# Patient Record
Sex: FEMALE | Race: WHITE | Employment: FULL TIME | URBAN - NONMETROPOLITAN AREA
[De-identification: names, ages, dates, MRNs, and addresses within clinical notes are randomized per-mention and may not be internally consistent; named-entity substitution may affect disease eponyms.]

---

## 2018-07-29 ENCOUNTER — APPOINTMENT (OUTPATIENT)
Dept: CT IMAGING | Age: 63
End: 2018-07-29
Payer: COMMERCIAL

## 2018-07-29 ENCOUNTER — HOSPITAL ENCOUNTER (EMERGENCY)
Age: 63
Discharge: HOME | End: 2018-07-29
Attending: EMERGENCY MEDICINE
Payer: COMMERCIAL

## 2018-07-29 VITALS
HEART RATE: 75 BPM | OXYGEN SATURATION: 98 % | RESPIRATION RATE: 16 BRPM | TEMPERATURE: 98.4 F | DIASTOLIC BLOOD PRESSURE: 81 MMHG | SYSTOLIC BLOOD PRESSURE: 115 MMHG | WEIGHT: 200 LBS

## 2018-07-29 DIAGNOSIS — B02.29 POST HERPETIC NEURALGIA: Primary | ICD-10-CM

## 2018-07-29 DIAGNOSIS — R51.9 NONINTRACTABLE EPISODIC HEADACHE, UNSPECIFIED HEADACHE TYPE: ICD-10-CM

## 2018-07-29 DIAGNOSIS — S16.1XXA CERVICAL STRAIN, ACUTE, INITIAL ENCOUNTER: ICD-10-CM

## 2018-07-29 LAB
ABSOLUTE EOS #: 0.14 K/UL (ref 0–0.44)
ABSOLUTE IMMATURE GRANULOCYTE: <0.03 K/UL (ref 0–0.3)
ABSOLUTE LYMPH #: 1.56 K/UL (ref 1.1–3.7)
ABSOLUTE MONO #: 0.38 K/UL (ref 0.1–1.2)
ANION GAP SERPL CALCULATED.3IONS-SCNC: 13 MMOL/L (ref 9–17)
BASOPHILS # BLD: 1 % (ref 0–2)
BASOPHILS ABSOLUTE: 0.03 K/UL (ref 0–0.2)
BUN BLDV-MCNC: 7 MG/DL (ref 8–23)
BUN/CREAT BLD: 11 (ref 9–20)
CALCIUM SERPL-MCNC: 8.9 MG/DL (ref 8.6–10.4)
CHLORIDE BLD-SCNC: 105 MMOL/L (ref 98–107)
CO2: 25 MMOL/L (ref 20–31)
CREAT SERPL-MCNC: 0.64 MG/DL (ref 0.5–0.9)
DIFFERENTIAL TYPE: NORMAL
EKG ATRIAL RATE: 80 BPM
EKG P AXIS: 37 DEGREES
EKG P-R INTERVAL: 150 MS
EKG Q-T INTERVAL: 412 MS
EKG QRS DURATION: 84 MS
EKG QTC CALCULATION (BAZETT): 475 MS
EKG R AXIS: -9 DEGREES
EKG T AXIS: 34 DEGREES
EKG VENTRICULAR RATE: 80 BPM
EOSINOPHILS RELATIVE PERCENT: 2 % (ref 1–4)
GFR AFRICAN AMERICAN: >60 ML/MIN
GFR NON-AFRICAN AMERICAN: >60 ML/MIN
GFR SERPL CREATININE-BSD FRML MDRD: ABNORMAL ML/MIN/{1.73_M2}
GFR SERPL CREATININE-BSD FRML MDRD: ABNORMAL ML/MIN/{1.73_M2}
GLUCOSE BLD-MCNC: 127 MG/DL (ref 70–99)
HCT VFR BLD CALC: 39.9 % (ref 36.3–47.1)
HEMOGLOBIN: 13.4 G/DL (ref 11.9–15.1)
IMMATURE GRANULOCYTES: 0 %
LYMPHOCYTES # BLD: 27 % (ref 24–43)
MCH RBC QN AUTO: 29.8 PG (ref 25.2–33.5)
MCHC RBC AUTO-ENTMCNC: 33.6 G/DL (ref 28.4–34.8)
MCV RBC AUTO: 88.9 FL (ref 82.6–102.9)
MONOCYTES # BLD: 7 % (ref 3–12)
NRBC AUTOMATED: 0 PER 100 WBC
PDW BLD-RTO: 12.7 % (ref 11.8–14.4)
PLATELET # BLD: 210 K/UL (ref 138–453)
PLATELET ESTIMATE: NORMAL
PMV BLD AUTO: 10.8 FL (ref 8.1–13.5)
POTASSIUM SERPL-SCNC: 3.8 MMOL/L (ref 3.7–5.3)
RBC # BLD: 4.49 M/UL (ref 3.95–5.11)
RBC # BLD: NORMAL 10*6/UL
SEG NEUTROPHILS: 63 % (ref 36–65)
SEGMENTED NEUTROPHILS ABSOLUTE COUNT: 3.77 K/UL (ref 1.5–8.1)
SODIUM BLD-SCNC: 143 MMOL/L (ref 135–144)
TROPONIN INTERP: NORMAL
TROPONIN T: <0.03 NG/ML
WBC # BLD: 5.9 K/UL (ref 3.5–11.3)
WBC # BLD: NORMAL 10*3/UL

## 2018-07-29 PROCEDURE — 6370000000 HC RX 637 (ALT 250 FOR IP): Performed by: EMERGENCY MEDICINE

## 2018-07-29 PROCEDURE — 93005 ELECTROCARDIOGRAM TRACING: CPT

## 2018-07-29 PROCEDURE — 85025 COMPLETE CBC W/AUTO DIFF WBC: CPT

## 2018-07-29 PROCEDURE — 70450 CT HEAD/BRAIN W/O DYE: CPT

## 2018-07-29 PROCEDURE — 72125 CT NECK SPINE W/O DYE: CPT

## 2018-07-29 PROCEDURE — 36415 COLL VENOUS BLD VENIPUNCTURE: CPT

## 2018-07-29 PROCEDURE — 84484 ASSAY OF TROPONIN QUANT: CPT

## 2018-07-29 PROCEDURE — 99284 EMERGENCY DEPT VISIT MOD MDM: CPT

## 2018-07-29 PROCEDURE — 80048 BASIC METABOLIC PNL TOTAL CA: CPT

## 2018-07-29 RX ORDER — GABAPENTIN 100 MG/1
100 CAPSULE ORAL 3 TIMES DAILY
Qty: 30 CAPSULE | Refills: 0 | Status: SHIPPED | OUTPATIENT
Start: 2018-07-29 | End: 2018-07-29

## 2018-07-29 RX ORDER — AMOXICILLIN AND CLAVULANATE POTASSIUM 875; 125 MG/1; MG/1
1 TABLET, FILM COATED ORAL 2 TIMES DAILY
Qty: 20 TABLET | Refills: 0 | Status: SHIPPED | OUTPATIENT
Start: 2018-07-29 | End: 2018-08-08

## 2018-07-29 RX ORDER — IBUPROFEN 600 MG/1
600 TABLET ORAL ONCE
Status: COMPLETED | OUTPATIENT
Start: 2018-07-29 | End: 2018-07-29

## 2018-07-29 RX ORDER — ASPIRIN 81 MG/1
324 TABLET, CHEWABLE ORAL ONCE
Status: COMPLETED | OUTPATIENT
Start: 2018-07-29 | End: 2018-07-29

## 2018-07-29 RX ORDER — FENTANYL CITRATE 50 UG/ML
50 INJECTION, SOLUTION INTRAMUSCULAR; INTRAVENOUS ONCE
Status: DISCONTINUED | OUTPATIENT
Start: 2018-07-29 | End: 2018-07-29

## 2018-07-29 RX ORDER — GABAPENTIN 100 MG/1
100 CAPSULE ORAL 3 TIMES DAILY
Qty: 30 CAPSULE | Refills: 0 | Status: SHIPPED | OUTPATIENT
Start: 2018-07-29 | End: 2019-07-29

## 2018-07-29 RX ADMIN — IBUPROFEN 600 MG: 600 TABLET ORAL at 14:11

## 2018-07-29 RX ADMIN — ASPIRIN 81 MG 324 MG: 81 TABLET ORAL at 14:54

## 2018-07-29 ASSESSMENT — PAIN DESCRIPTION - PAIN TYPE: TYPE: ACUTE PAIN

## 2018-07-29 ASSESSMENT — PAIN SCALES - GENERAL
PAINLEVEL_OUTOF10: 6
PAINLEVEL_OUTOF10: 7

## 2018-07-29 ASSESSMENT — PAIN DESCRIPTION - DESCRIPTORS: DESCRIPTORS: ACHING

## 2018-07-29 ASSESSMENT — PAIN DESCRIPTION - LOCATION: LOCATION: HEAD

## 2018-07-29 ASSESSMENT — PAIN DESCRIPTION - ORIENTATION: ORIENTATION: POSTERIOR

## 2018-07-29 NOTE — ED PROVIDER NOTES
[facility] ED  eMERGENCY dEPARTMENT eNCOUnter      Pt Name: Zohreh Valerio  MRN: [id number]  [place] 1955  Date of evaluation: 7/29/2018  Provider: Shanta Adan MD    [address] [place] [address]       Chief Complaint   Patient presents with    Headache     pt c/o posterior headache, ongoing for a couple months  - right sided facial tingling started today         HISTORY OF PRESENT ILLNESS   (Location/Symptom, Timing/Onset, Context/Setting, Quality, Duration, Modifying Factors, Severity)  Note limiting factors. Zohreh Valerio is a 65 y.o. female who presents to the emergency department With a complaint of an occipital headache and right-sided neck paingoing on for 2-3 days. The new symptom today is right-sided facial tingling and started about an hour ago. She has not noted weakness of her face nor she had a trouble with vision or speech. She has not had weakness of her right arm or leg. There's been no tingling of the right extremities either. She states that she had a similar headache a few years ago on the occipital area that improved while she changed jobs. She was doing repetitive motion activities that she believes caused a problem with her neck. No injuries recently. She has not noted any chest pain or shortness of breath. No diaphoresis. No vomiting or nausea. She says this headache has been gradual in onset over the last several days. No history of thunderclap type headaches. No family history please and had. There is history of stroke and a grandfather who was in his early 76s when he had a stroke. When I tried to question the right side of her face the tingling she says it is really more of a puffiness feeling as opposed to any loss of sensation. Admits she is having trouble characterizing it. She did drive herself here. Further history toward the end of her ear she revealed that she was diagnosed months ago shingles in the same area where she is experiencing the pain.   I think she has in fact is postherpetic neuralgia as etiology. She also has what looks like a sinusitis with air-fluid level on the left side. HPI    Nursing Notes were reviewed. REVIEW OF SYSTEMS    (2-9 systems for level 4, 10 or more for level 5)     Review of Systems  Constitutional no lightheadedness or syncope. No fevers or chills  Eyes no loss of vision or double vision  HEENT is reviewed in HPI. She denies neck stiffness. Cardiac no chest pain or palpitations  Respiratory no shortness of breath or cough  GI no abdominal pain or vomiting   no dysuria or frequency  Musculoskeletal no joint pain or calf tenderness  Skin no rash or bruising  Neurologic is reviewed in HPI. No trouble with her balance  Except as noted above the remainder of the review of systems was reviewed and negative. PAST MEDICAL HISTORY   History reviewed. No pertinent past medical history. SURGICAL HISTORY       Past Surgical History:   Procedure Laterality Date    CARPAL TUNNEL RELEASE      TUBAL LIGATION           CURRENT MEDICATIONS       Previous Medications    No medications on file       ALLERGIES     Patient has no known allergies. FAMILY HISTORY     History reviewed. No pertinent family history. SOCIAL HISTORY       Social History     Social History    Marital status:       Spouse name: N/A    Number of children: N/A    Years of education: N/A     Social History Main Topics    Smoking status: Never Smoker    Smokeless tobacco: Never Used    Alcohol use None    Drug use: Unknown    Sexual activity: Not Asked     Other Topics Concern    None     Social History Narrative    None       SCREENINGS             PHYSICAL EXAM    (up to 7 for level 4, 8 or more for level 5)     ED Triage Vitals   BP Temp Temp Source Pulse Resp SpO2 Height Weight   07/29/18 1257 07/29/18 1257 07/29/18 1257 07/29/18 1257 07/29/18 1257 07/29/18 1257 -- 07/29/18 1303   (!) 153/89 98.4 °F (36.9 °C) Tympanic 90 16 97 White count and hemoglobin both normal.  Labs Reviewed   CBC WITH AUTO DIFFERENTIAL   BASIC METABOLIC PANEL W/ REFLEX TO MG FOR LOW K    TROPONIN       All other labs were within normal range or not returned as of this dictation. EMERGENCY DEPARTMENT COURSE and DIFFERENTIAL DIAGNOSIS/MDM:   Vitals:    Vitals:    07/29/18 1257 07/29/18 1303   BP: (!) 153/89    Pulse: 90    Resp: 16    Temp: 98.4 °F (36.9 °C)    TempSrc: Tympanic    SpO2: 97%    Weight:  200 lb (90.7 kg)       Assessment patient again at 150. Her lab work looks normal.  The patient describes the pain more as sharp in nature. It is made worse by flexing her neck while lifting something up. When she turns her head to the side she also notes pain over the right side of her neck. She does report that she's had this for about 2 months now but in the last couple days has gotten worse. It certainly looks musculoskeletal    MDM     CRITICAL CARE TIME       CONSULTS:  None    PROCEDURES:  Unless otherwise noted below, none     Procedures    FINAL IMPRESSION    Acute cervical strain and headache  secondary to postherpetic neuralgia   Cervical strain  Tingling to right side of face without evidence of CVA or TIA  DISPOSITION/PLAN   DISPOSITION    Patient is discharged on Neurontin which she has used in the past for this with success. She is also advised to get some Claritin or Zyrtec to use for her sinusitis. PATIENT REFERRED TO:  No follow-up provider specified. DISCHARGE MEDICATIONS:  New Prescriptions    No medications on file              Summation      Patient Course:  See above history and physical    ED Medications administered this visit:  Medications - No data to display    New Prescriptions from this visit:    New Prescriptions    No medications on file       Follow-up:  No follow-up provider specified.       Final Impression: See above diagnosis           (Please note that portions of this note were completed with a voice recognition program.  Efforts were made to edit the dictations but occasionally words are mis-transcribed.)      Pedro Luis Reyes MD (electronically signed)  Attending Emergency Physician         Pedro Luis Reyes., MD  07/29/18 1349

## 2018-07-29 NOTE — ED NOTES
Discharge education reviewed with patient. Patient verbalized understanding of need to follow-up with PCP as well as understanding of medications. She denies further questions/concerns at this time.       Marcio Cutler RN  07/29/18 5919

## 2019-04-15 ENCOUNTER — HOSPITAL ENCOUNTER (OUTPATIENT)
Age: 64
Discharge: HOME | End: 2019-04-17
Payer: COMMERCIAL

## 2019-04-15 ENCOUNTER — HOSPITAL ENCOUNTER (OUTPATIENT)
Dept: GENERAL RADIOLOGY | Age: 64
Discharge: HOME | End: 2019-04-17
Payer: COMMERCIAL

## 2019-04-15 DIAGNOSIS — R10.84 ABDOMINAL PAIN, GENERALIZED: ICD-10-CM

## 2019-04-15 PROCEDURE — 74019 RADEX ABDOMEN 2 VIEWS: CPT

## 2019-05-29 ENCOUNTER — HOSPITAL ENCOUNTER (OUTPATIENT)
Dept: WOMENS IMAGING | Age: 64
Discharge: HOME | End: 2019-05-31
Payer: COMMERCIAL

## 2019-05-29 DIAGNOSIS — Z12.39 SCREENING BREAST EXAMINATION: ICD-10-CM

## 2019-05-29 PROCEDURE — 77063 BREAST TOMOSYNTHESIS BI: CPT

## 2019-06-20 ENCOUNTER — HOSPITAL ENCOUNTER (OUTPATIENT)
Dept: ULTRASOUND IMAGING | Age: 64
Discharge: HOME | End: 2019-06-22
Payer: COMMERCIAL

## 2019-06-20 ENCOUNTER — HOSPITAL ENCOUNTER (OUTPATIENT)
Dept: WOMENS IMAGING | Age: 64
Discharge: HOME | End: 2019-06-22
Payer: COMMERCIAL

## 2019-06-20 DIAGNOSIS — R92.8 ABNORMAL SCREENING MAMMOGRAM: ICD-10-CM

## 2019-06-20 PROCEDURE — G0279 TOMOSYNTHESIS, MAMMO: HCPCS

## 2019-06-20 PROCEDURE — 76641 ULTRASOUND BREAST COMPLETE: CPT

## 2019-07-29 ENCOUNTER — HOSPITAL ENCOUNTER (OUTPATIENT)
Dept: NON INVASIVE DIAGNOSTICS | Age: 64
Discharge: HOME | End: 2019-07-29
Payer: COMMERCIAL

## 2019-07-29 PROCEDURE — 93017 CV STRESS TEST TRACING ONLY: CPT

## 2019-07-29 RX ORDER — ATORVASTATIN CALCIUM 10 MG/1
10 TABLET, FILM COATED ORAL
COMMUNITY
End: 2019-07-30 | Stop reason: DRUGHIGH

## 2019-07-29 NOTE — PROGRESS NOTES
Dr. Alex Cotto notified of ekg changes on regular stress. Dr. Delvis Shah notified as well. Patient to see Dr. Alex Cotto Tuesday, July 30 at 3:00 p.m. Patient instructed if she has chest pain she is to go to the emergency room immediately. She verbalized understanding. yes

## 2019-07-30 ENCOUNTER — HOSPITAL ENCOUNTER (OUTPATIENT)
Age: 64
Discharge: HOME | End: 2019-07-30
Payer: COMMERCIAL

## 2019-07-30 ENCOUNTER — HOSPITAL ENCOUNTER (OUTPATIENT)
Dept: NON INVASIVE DIAGNOSTICS | Age: 64
Discharge: HOME | End: 2019-07-30
Payer: COMMERCIAL

## 2019-07-30 ENCOUNTER — OFFICE VISIT (OUTPATIENT)
Dept: CARDIOLOGY | Age: 64
End: 2019-07-30
Payer: COMMERCIAL

## 2019-07-30 VITALS
HEART RATE: 66 BPM | DIASTOLIC BLOOD PRESSURE: 80 MMHG | BODY MASS INDEX: 33.23 KG/M2 | OXYGEN SATURATION: 98 % | WEIGHT: 206.8 LBS | HEIGHT: 66 IN | SYSTOLIC BLOOD PRESSURE: 125 MMHG

## 2019-07-30 DIAGNOSIS — R94.39 ABNORMAL STRESS TEST: ICD-10-CM

## 2019-07-30 DIAGNOSIS — E78.2 MIXED HYPERLIPIDEMIA: ICD-10-CM

## 2019-07-30 DIAGNOSIS — I20.8 ANGINAL EQUIVALENT (HCC): ICD-10-CM

## 2019-07-30 DIAGNOSIS — I20.8 STABLE ANGINA PECTORIS (HCC): ICD-10-CM

## 2019-07-30 DIAGNOSIS — R94.39 ABNORMAL STRESS TEST: Primary | ICD-10-CM

## 2019-07-30 LAB
ABSOLUTE EOS #: 0.2 K/UL (ref 0–0.44)
ABSOLUTE IMMATURE GRANULOCYTE: <0.03 K/UL (ref 0–0.3)
ABSOLUTE LYMPH #: 2.17 K/UL (ref 1.1–3.7)
ABSOLUTE MONO #: 0.6 K/UL (ref 0.1–1.2)
ANION GAP SERPL CALCULATED.3IONS-SCNC: 11 MMOL/L (ref 9–17)
BASOPHILS # BLD: 1 % (ref 0–2)
BASOPHILS ABSOLUTE: 0.05 K/UL (ref 0–0.2)
BUN BLDV-MCNC: 11 MG/DL (ref 8–23)
BUN/CREAT BLD: 15 (ref 9–20)
CALCIUM SERPL-MCNC: 9.2 MG/DL (ref 8.6–10.4)
CHLORIDE BLD-SCNC: 105 MMOL/L (ref 98–107)
CO2: 27 MMOL/L (ref 20–31)
CREAT SERPL-MCNC: 0.74 MG/DL (ref 0.5–0.9)
DIFFERENTIAL TYPE: NORMAL
EOSINOPHILS RELATIVE PERCENT: 3 % (ref 1–4)
GFR AFRICAN AMERICAN: >60 ML/MIN
GFR NON-AFRICAN AMERICAN: >60 ML/MIN
GFR SERPL CREATININE-BSD FRML MDRD: NORMAL ML/MIN/{1.73_M2}
GFR SERPL CREATININE-BSD FRML MDRD: NORMAL ML/MIN/{1.73_M2}
GLUCOSE BLD-MCNC: 99 MG/DL (ref 70–99)
HCT VFR BLD CALC: 41.3 % (ref 36.3–47.1)
HEMOGLOBIN: 13.3 G/DL (ref 11.9–15.1)
IMMATURE GRANULOCYTES: 0 %
LV EF: 55 %
LVEF MODALITY: NORMAL
LYMPHOCYTES # BLD: 28 % (ref 24–43)
MCH RBC QN AUTO: 29.6 PG (ref 25.2–33.5)
MCHC RBC AUTO-ENTMCNC: 32.2 G/DL (ref 28.4–34.8)
MCV RBC AUTO: 92 FL (ref 82.6–102.9)
MONOCYTES # BLD: 8 % (ref 3–12)
NRBC AUTOMATED: 0 PER 100 WBC
PDW BLD-RTO: 13 % (ref 11.8–14.4)
PLATELET # BLD: 227 K/UL (ref 138–453)
PLATELET ESTIMATE: NORMAL
PMV BLD AUTO: 10.8 FL (ref 8.1–13.5)
POTASSIUM SERPL-SCNC: 4.1 MMOL/L (ref 3.7–5.3)
RBC # BLD: 4.49 M/UL (ref 3.95–5.11)
RBC # BLD: NORMAL 10*6/UL
SEG NEUTROPHILS: 60 % (ref 36–65)
SEGMENTED NEUTROPHILS ABSOLUTE COUNT: 4.81 K/UL (ref 1.5–8.1)
SODIUM BLD-SCNC: 143 MMOL/L (ref 135–144)
WBC # BLD: 7.9 K/UL (ref 3.5–11.3)
WBC # BLD: NORMAL 10*3/UL

## 2019-07-30 PROCEDURE — 85025 COMPLETE CBC W/AUTO DIFF WBC: CPT

## 2019-07-30 PROCEDURE — 93306 TTE W/DOPPLER COMPLETE: CPT

## 2019-07-30 PROCEDURE — 36415 COLL VENOUS BLD VENIPUNCTURE: CPT

## 2019-07-30 PROCEDURE — 80048 BASIC METABOLIC PNL TOTAL CA: CPT

## 2019-07-30 PROCEDURE — 99243 OFF/OP CNSLTJ NEW/EST LOW 30: CPT | Performed by: INTERNAL MEDICINE

## 2019-07-30 RX ORDER — ATORVASTATIN CALCIUM 20 MG/1
20 TABLET, FILM COATED ORAL DAILY
Qty: 90 TABLET | Refills: 3 | Status: SHIPPED | OUTPATIENT
Start: 2019-07-30

## 2019-07-30 RX ORDER — METOPROLOL SUCCINATE 25 MG/1
25 TABLET, EXTENDED RELEASE ORAL DAILY
Qty: 90 TABLET | Refills: 3 | Status: SHIPPED | OUTPATIENT
Start: 2019-07-30

## 2019-07-30 RX ORDER — ATORVASTATIN CALCIUM 10 MG/1
10 TABLET, FILM COATED ORAL EVERY OTHER DAY
COMMUNITY
End: 2019-07-30 | Stop reason: DRUGHIGH

## 2019-07-30 RX ORDER — ASPIRIN 81 MG/1
81 TABLET ORAL DAILY
Qty: 90 TABLET | Refills: 3 | COMMUNITY
Start: 2019-07-30

## 2019-07-30 NOTE — PROCEDURES
III, aVF, V4, V5, V6   which did meet diagnostic criteria for myocardial ischemia with no  premature atrial contractions (PACs) and no premature ventricular  contractions (PVCs). IMPRESSION:  1. Significant electrocardiographic evidence of myocardial ischemia  during EKG monitoring without significant associated arrhythmias. 2. Ischemic ECG changes with chest pain at low level of exercise,  consider further ischemic work up, preferably via cardiac cath if  clinically indicated. The patient's Duke Treadmill score is -7 which correlates with an  intermediate/high risk significant coronary artery disease. The results of this test were discussed with Dr. Santi Mascorro on 07/29/2019  at [id number]. DEVENDRA CASTRO    D: 07/30/2019 11:04:26       T: 07/30/2019 11:05:40     [email or web address]  Job#: [id number]     Doc#: Unknown    CC:  Jase Neff

## 2019-07-30 NOTE — PROGRESS NOTES
Associated with significant shortness of breath. She takes pepcid to help relieve this pain to sleep and this helps. She gets chest ache/pressure after work and does not worsen after eating. 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 She has occasional palpitations that feel like a flutter. She also has lightheadedness/dizziness multiple times per day. Here lately, she has been having trouble sleeping. Ms. Hardik Elizabeth denied any abdominal pain, bleeding problems, problems with her medications or any other concerns at this time. PAST MEDICAL HISTORY:        Past Medical History:   Diagnosis Date    Hyperlipidemia            CURRENT ALLERGIES: Patient has no known allergies. REVIEW OF SYSTEMS: 14 systems were reviewed. Pertinent positives and negatives as above, all else negative. Past Surgical History:   Procedure Laterality Date    CARPAL TUNNEL RELEASE      TUBAL LIGATION      Social History:  Social History     Tobacco Use    Smoking status: Former Smoker     Years: 15.00     Types: Cigarettes     Last attempt to quit:      Years since quittin.    Smokeless tobacco: Never Used   Substance Use Topics    Alcohol use: Not on file    Drug use: Not on file        CURRENT MEDICATIONS:        Outpatient Medications Marked as Taking for the 19 encounter (Office Visit) with Tin Garcia MD   Medication Sig Dispense Refill    aspirin EC 81 MG EC tablet Take 1 tablet by mouth daily 90 tablet 3    metoprolol succinate (TOPROL XL) 25 MG extended release tablet Take 1 tablet by mouth daily 90 tablet 3    atorvastatin (LIPITOR) 20 MG tablet Take 1 tablet by mouth daily 90 tablet 3       FAMILY HISTORY: family history includes Heart Attack in her mother; Heart Disease in her father, mother, and sister; Heart Surgery in her father.      Physical Examination:     /80 (Site: Left Upper Arm, Position: side effects of this medication including lightheadedness and dizziness and instructed them to stop the medication of this occurs and call our office if this occurs.  Cholesterol Reduction Therapy: INCREASE to Atorvastatin (Lipitor) 20 mg daily. I discussed the potential benefits of statin therapy as well as the potential risks including myalgia as well as the rare but potentially serious complication of liver or kidney damage. Although rare, I told them that this could be serious and therefore told them to stop the medication immediately and call if they developed any severe muscle aches or pains and they agreed to do so.  Additional counseling: None and I advised them to call our office or go to the emergency room if they developed worsening or persistent chest pain or increased shortness of breath as this could be life threatening. · Hyperlipidemia: Mixed  · Cholesterol Reduction Therapy: Continue Atorvastatin (Lipitor) 10 mg every other day. In the meantime, I encouraged Ms. Dilip Galvez to continue to take her other medications. FOLLOW UP:   I told Ms. Dilip Galvez to call my office if she had any problems, but otherwise I asked her to Return in about 1 week (around 8/6/2019) for Follow up. However, I would be happy to see her sooner should the need arise. Sincerely,  José Waters MD, F.A.C.C. [facility]    [address] [place] [address]  Phone: [phone], Fax: [phone]     I believe that the risk of significant morbidity and mortality related to the patient's current medical conditions are: intermediate-high. 40 minutes were spent with the patient and all of their questions were answered. The documentation recorded by the scribe, accurately and completely reflects the services I personally performed and the decisions made by me. José Waters MD, F.A.C.C.  July 30, 2019

## 2019-07-31 ENCOUNTER — HOSPITAL ENCOUNTER (OUTPATIENT)
Dept: CARDIAC CATH/INVASIVE PROCEDURES | Age: 64
Discharge: HOME | End: 2019-07-31
Payer: COMMERCIAL

## 2019-07-31 VITALS
SYSTOLIC BLOOD PRESSURE: 116 MMHG | HEART RATE: 77 BPM | HEIGHT: 66 IN | WEIGHT: 206 LBS | BODY MASS INDEX: 33.11 KG/M2 | TEMPERATURE: 97.8 F | RESPIRATION RATE: 17 BRPM | DIASTOLIC BLOOD PRESSURE: 71 MMHG

## 2019-07-31 PROCEDURE — 2709999900 HC NON-CHARGEABLE SUPPLY

## 2019-07-31 PROCEDURE — 2500000003 HC RX 250 WO HCPCS

## 2019-07-31 PROCEDURE — 7100000011 HC PHASE II RECOVERY - ADDTL 15 MIN

## 2019-07-31 PROCEDURE — 7100000010 HC PHASE II RECOVERY - FIRST 15 MIN

## 2019-07-31 PROCEDURE — C1894 INTRO/SHEATH, NON-LASER: HCPCS

## 2019-07-31 PROCEDURE — 93458 L HRT ARTERY/VENTRICLE ANGIO: CPT | Performed by: INTERNAL MEDICINE

## 2019-07-31 PROCEDURE — C1769 GUIDE WIRE: HCPCS

## 2019-07-31 PROCEDURE — 6360000004 HC RX CONTRAST MEDICATION

## 2019-07-31 PROCEDURE — 6360000002 HC RX W HCPCS

## 2019-07-31 PROCEDURE — C1725 CATH, TRANSLUMIN NON-LASER: HCPCS

## 2019-07-31 RX ORDER — ACETAMINOPHEN 325 MG/1
650 TABLET ORAL EVERY 4 HOURS PRN
Status: CANCELLED | OUTPATIENT
Start: 2019-07-31

## 2019-07-31 RX ORDER — SODIUM CHLORIDE 9 MG/ML
INJECTION, SOLUTION INTRAVENOUS CONTINUOUS
Status: DISCONTINUED | OUTPATIENT
Start: 2019-07-31 | End: 2019-08-01 | Stop reason: HOSPADM

## 2019-07-31 RX ORDER — SODIUM CHLORIDE 0.9 % (FLUSH) 0.9 %
10 SYRINGE (ML) INJECTION PRN
Status: CANCELLED | OUTPATIENT
Start: 2019-07-31

## 2019-07-31 RX ORDER — ONDANSETRON 2 MG/ML
4 INJECTION INTRAMUSCULAR; INTRAVENOUS EVERY 6 HOURS PRN
Status: CANCELLED | OUTPATIENT
Start: 2019-07-31

## 2019-07-31 RX ORDER — SODIUM CHLORIDE 0.9 % (FLUSH) 0.9 %
10 SYRINGE (ML) INJECTION EVERY 12 HOURS SCHEDULED
Status: CANCELLED | OUTPATIENT
Start: 2019-07-31

## 2019-07-31 RX ORDER — SODIUM CHLORIDE 9 MG/ML
INJECTION, SOLUTION INTRAVENOUS CONTINUOUS
Status: CANCELLED | OUTPATIENT
Start: 2019-07-31 | End: 2019-08-03

## 2019-07-31 RX ADMIN — SODIUM CHLORIDE: 9 INJECTION, SOLUTION INTRAVENOUS at 14:21

## 2019-07-31 NOTE — PROGRESS NOTES
Patient admitted, consent signed and questions answered. Patient ready for procedure. Call light to reach with side rails up 2 of 2. sites clipped. family at bedside with patient. History and physical needs updated.

## 2019-07-31 NOTE — PROCEDURES
The patient was referred for coronary angiography and possible PCI, benefits, risks and alternatives explained and consent signed. Normal coronary arteries. Left ventricular ejection fraction is 60%. Will follow with Dr. Sven Boas as outpatient.

## 2020-10-29 ENCOUNTER — APPOINTMENT (OUTPATIENT)
Dept: CT IMAGING | Age: 65
End: 2020-10-29
Payer: COMMERCIAL

## 2020-10-29 ENCOUNTER — HOSPITAL ENCOUNTER (EMERGENCY)
Age: 65
Discharge: HOME | End: 2020-10-29
Attending: EMERGENCY MEDICINE
Payer: COMMERCIAL

## 2020-10-29 VITALS
WEIGHT: 210 LBS | HEIGHT: 66 IN | TEMPERATURE: 96.7 F | OXYGEN SATURATION: 96 % | HEART RATE: 89 BPM | SYSTOLIC BLOOD PRESSURE: 168 MMHG | RESPIRATION RATE: 18 BRPM | BODY MASS INDEX: 33.75 KG/M2 | DIASTOLIC BLOOD PRESSURE: 87 MMHG

## 2020-10-29 PROBLEM — M51.369 BULGING LUMBAR DISC: Status: ACTIVE | Noted: 2020-10-29

## 2020-10-29 PROBLEM — M54.32 SCIATICA OF LEFT SIDE: Status: ACTIVE | Noted: 2020-10-29

## 2020-10-29 PROBLEM — M51.36 BULGING LUMBAR DISC: Status: ACTIVE | Noted: 2020-10-29

## 2020-10-29 PROCEDURE — 72131 CT LUMBAR SPINE W/O DYE: CPT

## 2020-10-29 PROCEDURE — 99285 EMERGENCY DEPT VISIT HI MDM: CPT

## 2020-10-29 PROCEDURE — 6360000002 HC RX W HCPCS: Performed by: EMERGENCY MEDICINE

## 2020-10-29 PROCEDURE — 6370000000 HC RX 637 (ALT 250 FOR IP): Performed by: EMERGENCY MEDICINE

## 2020-10-29 PROCEDURE — 96372 THER/PROPH/DIAG INJ SC/IM: CPT

## 2020-10-29 RX ORDER — ORPHENADRINE CITRATE 30 MG/ML
30 INJECTION INTRAMUSCULAR; INTRAVENOUS ONCE
Status: COMPLETED | OUTPATIENT
Start: 2020-10-29 | End: 2020-10-29

## 2020-10-29 RX ORDER — KETOROLAC TROMETHAMINE 15 MG/ML
30 INJECTION, SOLUTION INTRAMUSCULAR; INTRAVENOUS ONCE
Status: COMPLETED | OUTPATIENT
Start: 2020-10-29 | End: 2020-10-29

## 2020-10-29 RX ORDER — PREDNISONE 20 MG/1
20 TABLET ORAL 2 TIMES DAILY
Qty: 14 TABLET | Refills: 0 | Status: SHIPPED | OUTPATIENT
Start: 2020-10-29 | End: 2020-11-05

## 2020-10-29 RX ORDER — ORPHENADRINE CITRATE 100 MG/1
100 TABLET, EXTENDED RELEASE ORAL 2 TIMES DAILY
Qty: 20 TABLET | Refills: 0 | Status: SHIPPED | OUTPATIENT
Start: 2020-10-29 | End: 2020-11-08

## 2020-10-29 RX ORDER — PREDNISONE 20 MG/1
20 TABLET ORAL ONCE
Status: COMPLETED | OUTPATIENT
Start: 2020-10-29 | End: 2020-10-29

## 2020-10-29 RX ADMIN — ORPHENADRINE CITRATE 30 MG: 30 INJECTION INTRAMUSCULAR; INTRAVENOUS at 15:19

## 2020-10-29 RX ADMIN — PREDNISONE 20 MG: 20 TABLET ORAL at 15:26

## 2020-10-29 RX ADMIN — KETOROLAC TROMETHAMINE 30 MG: 15 INJECTION, SOLUTION INTRAMUSCULAR; INTRAVENOUS at 15:16

## 2020-10-29 ASSESSMENT — PAIN DESCRIPTION - ORIENTATION: ORIENTATION: LOWER;RIGHT;LEFT

## 2020-10-29 ASSESSMENT — PAIN DESCRIPTION - LOCATION: LOCATION: BACK

## 2020-10-29 ASSESSMENT — ENCOUNTER SYMPTOMS
ABDOMINAL DISTENTION: 0
COUGH: 0
SHORTNESS OF BREATH: 0
ABDOMINAL PAIN: 0
WHEEZING: 0
BACK PAIN: 1

## 2020-10-29 ASSESSMENT — PAIN SCALES - GENERAL
PAINLEVEL_OUTOF10: 6
PAINLEVEL_OUTOF10: 6
PAINLEVEL_OUTOF10: 4

## 2020-10-29 ASSESSMENT — PAIN DESCRIPTION - PAIN TYPE: TYPE: ACUTE PAIN

## 2020-10-29 ASSESSMENT — PAIN DESCRIPTION - DIRECTION: RADIATING_TOWARDS: BILATERAL LEGS, ABDOMEN

## 2020-10-29 ASSESSMENT — PAIN DESCRIPTION - DESCRIPTORS: DESCRIPTORS: BURNING

## 2020-10-29 NOTE — ED PROVIDER NOTES
[facility] ED  EMERGENCY DEPARTMENT ENCOUNTER      Pt Name:Lucy Murrell  MRN: [id number]  [place] 1955  Date of evaluation: 10/29/2020  Provider: Ebony Lewis MD    CHIEF COMPLAINT     Chief Complaint   Patient presents with    Back Pain     Lumbar, bilateral, radiation to bilateral legs and abdomen. Onset yesterday morning, worse today. Pt describes as burning. HISTORY OF PRESENT ILLNESS   (Location/Symptom, Timing/Onset, Context/Setting, Quality, Duration, Modifying Factors, Severity)  Note limiting factors. HPI the patient is a 78-year-old female, who presents with low back pain. She denies falling or any other injury. 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 The pain seems to go down into the left lateral thigh but does not go past the knee. She rates the pain at a level 6 and it is sharp. Sitting up, lying down, turning are all negative    Nursing Notes were reviewed. REVIEW OF SYSTEMS    (2-9 systems for level 4, 10 or more for level 5)     Review of Systems   Constitutional: Positive for activity change. Respiratory: Negative for cough, shortness of breath and wheezing. Cardiovascular: Negative for chest pain, palpitations and leg swelling. Gastrointestinal: Negative for abdominal distention and abdominal pain. No incontinence of stool. Genitourinary: Negative for dysuria, flank pain and frequency. No incontinence of urine. Musculoskeletal: Positive for arthralgias, back pain and gait problem. Negative for neck pain and neck stiffness. Neurological: Negative for dizziness, light-headedness and headaches. Psychiatric/Behavioral: Negative for confusion, decreased concentration and dysphoric mood.               MEDICAL HISTORY     Past Medical History:   Diagnosis Date    H/O cardiac catheterization 07/31/2019    Normal coronary arteries Normal global Lt ventricular systolic function     H/O cardiovascular stress test 2019    Significant electrocardiographic evidence of MI during EKG monitoring w/o significant asso arrhythmias Treadmill Duke sore -7 intermediate/high risk significant CAD    History of echocardiogram 2019    EF of >55%. No clear evidence of diastolic dysfunction is seen.  Hyperlipidemia          SURGICAL HISTORY       Past Surgical History:   Procedure Laterality Date    CARPAL TUNNEL RELEASE      TUBAL LIGATION           CURRENT MEDICATIONS       Previous Medications    ASPIRIN EC 81 MG EC TABLET    Take 1 tablet by mouth daily    ATORVASTATIN (LIPITOR) 20 MG TABLET    Take 1 tablet by mouth daily    METOPROLOL SUCCINATE (TOPROL XL) 25 MG EXTENDED RELEASE TABLET    Take 1 tablet by mouth daily       ALLERGIES     Patient has no known allergies. FAMILY HISTORY       Family History   Problem Relation Age of Onset    Heart Disease Mother     Heart Attack Mother         requiring stenting in 62s    Heart Disease Father     Heart Surgery Father         46s    Heart Disease Sister         Stenting in her 62s          SOCIAL HISTORY       Social History     Socioeconomic History    Marital status:       Spouse name: None    Number of children: None    Years of education: None    Highest education level: None   Occupational History    None   Social Needs    Financial resource strain: None    Food insecurity     Worry: None     Inability: None    Transportation needs     Medical: None     Non-medical: None   Tobacco Use    Smoking status: Former Smoker     Years: 15.00     Types: Cigarettes     Last attempt to quit:      Years since quittin.8    Smokeless tobacco: Never Used   Substance and Sexual Activity    Alcohol use: None    Drug use: None    Sexual activity: None   Lifestyle    Physical activity     Days per week: None     Minutes per session: None    Stress: None   Relationships    Social connections     Talks on phone: None     Gets together: person, place, and time. Mental status is at baseline. Cranial Nerves: No cranial nerve deficit. Sensory: No sensory deficit. Motor: No weakness. Coordination: Coordination normal.   Psychiatric:         Mood and Affect: Mood normal.         Behavior: Behavior normal.         Thought Content: Thought content normal.         Judgment: Judgment normal.         DIAGNOSTIC RESULTS     EKG: All EKG's are interpreted by the Emergency Department Physician whoeither signs or Co-signs this chart in the absence of a cardiologist.      RADIOLOGY:   Non-plain film images such as CT, Ultrasound and MRI are read by the radiologist. Plain radiographic images are visualized and preliminarily interpreted by the emergency physician     Interpretation per the Radiologist below, if available at the time of this note:    Helen   Final Result   1. No acute fracture. 2. Mild degenerative changes. 3. L4-5 disc bulge with mild central canal narrowing. ED BEDSIDE ULTRASOUND:   Performed by ED Physician - none    LABS:  Labs Reviewed - No data to display    EMERGENCY DEPARTMENT COURSE and DIFFERENTIAL DIAGNOSIS/MDM:   Vitals:    Vitals:    10/29/20 1422   BP: (!) 168/87   Pulse: 89   Resp: 18   Temp: 96.7 °F (35.9 °C)   TempSrc: Tympanic   SpO2: 96%   Weight: 210 lb (95.3 kg)   Height: 5' 6\" (1.676 m)           MDM the patient is found to have a bulging disc. She will be put on his own and Norflex. Ultimately she may need to go onto Neurontin but I will leave this up to her PCP. CONSULTS:  None    PROCEDURES:  Unless otherwise noted below, none     Procedures    FINAL IMPRESSION      1. Bulging lumbar disc    2.  Sciatica of left side          DISPOSITION/PLAN   DISPOSITION Decision To Discharge 10/29/2020 04:05:43 PM      PATIENT REFERRED TO:  Briseida Simon MD  [address] [place] [address]  [phone]      Recheck next week      DISCHARGE MEDICATIONS:  New Prescriptions ORPHENADRINE (NORFLEX) 100 MG EXTENDED RELEASE TABLET    Take 1 tablet by mouth 2 times daily for 10 days    PREDNISONE (DELTASONE) 20 MG TABLET    Take 1 tablet by mouth 2 times daily for 7 days              (Please note that portions ofthis note were completed with a voice recognition program.  Efforts were made to edit the dictations but occasionally words are mis-transcribed.)      Marcy Baig MD (electronically signed)  Attending Emergency Physician          Yolanda Yoon MD  10/29/20 2848

## 2021-08-19 ENCOUNTER — APPOINTMENT (OUTPATIENT)
Dept: GENERAL RADIOLOGY | Age: 66
End: 2021-08-19
Payer: COMMERCIAL

## 2021-08-19 ENCOUNTER — HOSPITAL ENCOUNTER (EMERGENCY)
Age: 66
Discharge: HOME | End: 2021-08-19
Attending: EMERGENCY MEDICINE
Payer: COMMERCIAL

## 2021-08-19 VITALS
DIASTOLIC BLOOD PRESSURE: 88 MMHG | OXYGEN SATURATION: 99 % | TEMPERATURE: 98.4 F | SYSTOLIC BLOOD PRESSURE: 159 MMHG | HEART RATE: 63 BPM | RESPIRATION RATE: 14 BRPM | WEIGHT: 200 LBS | BODY MASS INDEX: 32.28 KG/M2

## 2021-08-19 DIAGNOSIS — J06.9 ACUTE UPPER RESPIRATORY INFECTION: Primary | ICD-10-CM

## 2021-08-19 LAB
-: NORMAL
ABSOLUTE EOS #: 0.03 K/UL (ref 0–0.44)
ABSOLUTE IMMATURE GRANULOCYTE: 0.04 K/UL (ref 0–0.3)
ABSOLUTE LYMPH #: 1.45 K/UL (ref 1.1–3.7)
ABSOLUTE MONO #: 0.6 K/UL (ref 0.1–1.2)
ALBUMIN SERPL-MCNC: 4.8 G/DL (ref 3.5–5.2)
ALBUMIN/GLOBULIN RATIO: 1.4 (ref 1–2.5)
ALP BLD-CCNC: 74 U/L (ref 35–104)
ALT SERPL-CCNC: 22 U/L (ref 5–33)
AMORPHOUS: NORMAL
ANION GAP SERPL CALCULATED.3IONS-SCNC: 12 MMOL/L (ref 9–17)
AST SERPL-CCNC: 19 U/L
BACTERIA: NORMAL
BASOPHILS # BLD: 0 % (ref 0–2)
BASOPHILS ABSOLUTE: 0.03 K/UL (ref 0–0.2)
BILIRUB SERPL-MCNC: 0.63 MG/DL (ref 0.3–1.2)
BILIRUBIN URINE: NEGATIVE
BUN BLDV-MCNC: 11 MG/DL (ref 8–23)
BUN/CREAT BLD: 18 (ref 9–20)
CALCIUM SERPL-MCNC: 9.9 MG/DL (ref 8.6–10.4)
CASTS UA: NORMAL /LPF
CHLORIDE BLD-SCNC: 102 MMOL/L (ref 98–107)
CO2: 24 MMOL/L (ref 20–31)
COLOR: YELLOW
COMMENT UA: ABNORMAL
CREAT SERPL-MCNC: 0.61 MG/DL (ref 0.5–0.9)
CRYSTALS, UA: NORMAL /HPF
DIFFERENTIAL TYPE: ABNORMAL
EKG ATRIAL RATE: 70 BPM
EKG P AXIS: 58 DEGREES
EKG P-R INTERVAL: 152 MS
EKG Q-T INTERVAL: 410 MS
EKG QRS DURATION: 76 MS
EKG QTC CALCULATION (BAZETT): 442 MS
EKG R AXIS: -2 DEGREES
EKG T AXIS: 17 DEGREES
EKG VENTRICULAR RATE: 70 BPM
EOSINOPHILS RELATIVE PERCENT: 0 % (ref 1–4)
EPITHELIAL CELLS UA: NORMAL /HPF (ref 0–25)
GFR AFRICAN AMERICAN: >60 ML/MIN
GFR NON-AFRICAN AMERICAN: >60 ML/MIN
GFR SERPL CREATININE-BSD FRML MDRD: NORMAL ML/MIN/{1.73_M2}
GFR SERPL CREATININE-BSD FRML MDRD: NORMAL ML/MIN/{1.73_M2}
GLUCOSE BLD-MCNC: 91 MG/DL (ref 70–99)
GLUCOSE URINE: NEGATIVE
HCT VFR BLD CALC: 41.7 % (ref 36.3–47.1)
HEMOGLOBIN: 13.5 G/DL (ref 11.9–15.1)
IMMATURE GRANULOCYTES: 0 %
KETONES, URINE: NEGATIVE
LEUKOCYTE ESTERASE, URINE: ABNORMAL
LYMPHOCYTES # BLD: 15 % (ref 24–43)
MCH RBC QN AUTO: 29.5 PG (ref 25.2–33.5)
MCHC RBC AUTO-ENTMCNC: 32.4 G/DL (ref 28.4–34.8)
MCV RBC AUTO: 91 FL (ref 82.6–102.9)
MONOCYTES # BLD: 6 % (ref 3–12)
MUCUS: NORMAL
NITRITE, URINE: NEGATIVE
NRBC AUTOMATED: 0 PER 100 WBC
OTHER OBSERVATIONS UA: NORMAL
PDW BLD-RTO: 12.6 % (ref 11.8–14.4)
PH UA: 8 (ref 5–9)
PLATELET # BLD: 233 K/UL (ref 138–453)
PLATELET ESTIMATE: ABNORMAL
PMV BLD AUTO: 10.2 FL (ref 8.1–13.5)
POTASSIUM SERPL-SCNC: 4.1 MMOL/L (ref 3.7–5.3)
PROTEIN UA: NEGATIVE
RBC # BLD: 4.58 M/UL (ref 3.95–5.11)
RBC # BLD: ABNORMAL 10*6/UL
RBC UA: NORMAL /HPF (ref 0–2)
RENAL EPITHELIAL, UA: NORMAL /HPF
SEG NEUTROPHILS: 79 % (ref 36–65)
SEGMENTED NEUTROPHILS ABSOLUTE COUNT: 7.42 K/UL (ref 1.5–8.1)
SODIUM BLD-SCNC: 138 MMOL/L (ref 135–144)
SPECIFIC GRAVITY UA: 1.01 (ref 1.01–1.02)
TOTAL PROTEIN: 8.2 G/DL (ref 6.4–8.3)
TRICHOMONAS: NORMAL
TROPONIN INTERP: NORMAL
TROPONIN INTERP: NORMAL
TROPONIN T: NORMAL NG/ML
TROPONIN T: NORMAL NG/ML
TROPONIN, HIGH SENSITIVITY: 9 NG/L (ref 0–14)
TROPONIN, HIGH SENSITIVITY: 9 NG/L (ref 0–14)
TURBIDITY: CLEAR
URINE HGB: NEGATIVE
UROBILINOGEN, URINE: NORMAL
WBC # BLD: 9.6 K/UL (ref 3.5–11.3)
WBC # BLD: ABNORMAL 10*3/UL
WBC UA: NORMAL /HPF (ref 0–5)
YEAST: NORMAL

## 2021-08-19 PROCEDURE — 81001 URINALYSIS AUTO W/SCOPE: CPT

## 2021-08-19 PROCEDURE — 6360000002 HC RX W HCPCS: Performed by: EMERGENCY MEDICINE

## 2021-08-19 PROCEDURE — 71045 X-RAY EXAM CHEST 1 VIEW: CPT

## 2021-08-19 PROCEDURE — 93005 ELECTROCARDIOGRAM TRACING: CPT | Performed by: EMERGENCY MEDICINE

## 2021-08-19 PROCEDURE — 36415 COLL VENOUS BLD VENIPUNCTURE: CPT

## 2021-08-19 PROCEDURE — 96374 THER/PROPH/DIAG INJ IV PUSH: CPT

## 2021-08-19 PROCEDURE — 85025 COMPLETE CBC W/AUTO DIFF WBC: CPT

## 2021-08-19 PROCEDURE — 80053 COMPREHEN METABOLIC PANEL: CPT

## 2021-08-19 PROCEDURE — 6370000000 HC RX 637 (ALT 250 FOR IP): Performed by: EMERGENCY MEDICINE

## 2021-08-19 PROCEDURE — 2580000003 HC RX 258: Performed by: EMERGENCY MEDICINE

## 2021-08-19 PROCEDURE — 93010 ELECTROCARDIOGRAM REPORT: CPT | Performed by: FAMILY MEDICINE

## 2021-08-19 PROCEDURE — 84484 ASSAY OF TROPONIN QUANT: CPT

## 2021-08-19 PROCEDURE — 99284 EMERGENCY DEPT VISIT MOD MDM: CPT

## 2021-08-19 PROCEDURE — C9113 INJ PANTOPRAZOLE SODIUM, VIA: HCPCS | Performed by: EMERGENCY MEDICINE

## 2021-08-19 RX ORDER — FLUTICASONE PROPIONATE 50 MCG
1 SPRAY, SUSPENSION (ML) NASAL DAILY
Status: DISCONTINUED | OUTPATIENT
Start: 2021-08-19 | End: 2021-08-19 | Stop reason: HOSPADM

## 2021-08-19 RX ORDER — CETIRIZINE HYDROCHLORIDE 10 MG/1
10 TABLET ORAL DAILY
Status: DISCONTINUED | OUTPATIENT
Start: 2021-08-19 | End: 2021-08-19 | Stop reason: HOSPADM

## 2021-08-19 RX ORDER — CETIRIZINE HYDROCHLORIDE 10 MG/1
10 TABLET ORAL DAILY
Qty: 30 TABLET | Refills: 0 | Status: SHIPPED | OUTPATIENT
Start: 2021-08-19

## 2021-08-19 RX ORDER — SODIUM CHLORIDE 9 MG/ML
10 INJECTION INTRAVENOUS ONCE
Status: COMPLETED | OUTPATIENT
Start: 2021-08-19 | End: 2021-08-19

## 2021-08-19 RX ORDER — 0.9 % SODIUM CHLORIDE 0.9 %
1000 INTRAVENOUS SOLUTION INTRAVENOUS ONCE
Status: COMPLETED | OUTPATIENT
Start: 2021-08-19 | End: 2021-08-19

## 2021-08-19 RX ORDER — PANTOPRAZOLE SODIUM 40 MG/10ML
40 INJECTION, POWDER, LYOPHILIZED, FOR SOLUTION INTRAVENOUS ONCE
Status: COMPLETED | OUTPATIENT
Start: 2021-08-19 | End: 2021-08-19

## 2021-08-19 RX ADMIN — SODIUM CHLORIDE 1000 ML: 9 INJECTION, SOLUTION INTRAVENOUS at 14:52

## 2021-08-19 RX ADMIN — CETIRIZINE HYDROCHLORIDE 10 MG: 10 TABLET, FILM COATED ORAL at 14:52

## 2021-08-19 RX ADMIN — FLUTICASONE PROPIONATE 1 SPRAY: 50 SPRAY, METERED NASAL at 14:52

## 2021-08-19 RX ADMIN — PANTOPRAZOLE SODIUM 40 MG: 40 INJECTION, POWDER, FOR SOLUTION INTRAVENOUS at 14:52

## 2021-08-19 RX ADMIN — SODIUM CHLORIDE, PRESERVATIVE FREE 10 ML: 5 INJECTION INTRAVENOUS at 14:55

## 2021-08-19 ASSESSMENT — ENCOUNTER SYMPTOMS
NAUSEA: 0
VOMITING: 1
SINUS PRESSURE: 1
ABDOMINAL DISTENTION: 0
CONSTIPATION: 0
WHEEZING: 0
SORE THROAT: 0
SHORTNESS OF BREATH: 0
DIARRHEA: 0
COUGH: 0
RHINORRHEA: 1

## 2021-08-19 NOTE — ED PROVIDER NOTES
file     Social Determinants of Health     Financial Resource Strain:     Difficulty of Paying Living Expenses:    Food Insecurity:     Worried About Running Out of Food in the Last Year:     [address] in the Last Year:    Transportation Needs:     Lack of Transportation (Medical):  Lack of Transportation (Non-Medical):    Physical Activity:     Days of Exercise per Week:     Minutes of Exercise per Session:    Stress:     Feeling of Stress :    Social Connections:     Frequency of Communication with Friends and Family:     Frequency of Social Gatherings with Friends and Family:     Attends Protestant Services:     Active Member of Clubs or Organizations:     Attends Club or Organization Meetings:     Marital Status:    Intimate Partner Violence:     Fear of Current or Ex-Partner:     Emotionally Abused:     Physically Abused:     Sexually Abused:        Family History   Problem Relation Age of Onset    Heart Disease Mother     Heart Attack Mother         requiring stenting in 62s    Heart Disease Father     Heart Surgery Father         46s    Heart Disease Sister         Stenting in her 62s       Allergies:  Patient has no known allergies. Home Medications:  Prior to Admission medications    Medication Sig Start Date End Date Taking? Authorizing Provider   cetirizine (ZYRTEC) 10 MG tablet Take 1 tablet by mouth daily 8/19/21  Yes Deandra Singh DO   aspirin EC 81 MG EC tablet Take 1 tablet by mouth daily 7/30/19   Dorian Marte MD   metoprolol succinate (TOPROL XL) 25 MG extended release tablet Take 1 tablet by mouth daily 7/30/19   Dorian Marte MD   atorvastatin (LIPITOR) 20 MG tablet Take 1 tablet by mouth daily 7/30/19   Dorian Marte MD       REVIEW OF SYSTEMS    (2-9 systems for level 4, 10 or more for level 5)      Review of Systems   Constitutional: Negative for activity change, appetite change, fatigue and fever.    HENT: Positive for congestion, postnasal drip, rhinorrhea and sinus pressure. Negative for sore throat. Respiratory: Negative for cough, shortness of breath and wheezing. Cardiovascular: Negative for chest pain, palpitations and leg swelling. Gastrointestinal: Positive for vomiting. Negative for abdominal distention, constipation, diarrhea and nausea. Genitourinary: Negative for decreased urine volume and dysuria. Skin: Negative for rash. Neurological: Positive for dizziness and light-headedness. Negative for weakness, numbness and headaches. PHYSICAL EXAM   (up to 7 for level 4, 8 or more for level 5)     INITIAL VITALS:   BP (!) 158/88   Pulse 64   Temp 98.4 °F (36.9 °C) (Tympanic)   Resp 18   Wt 200 lb (90.7 kg)   SpO2 100%   BMI 32.28 kg/m²     Physical Exam  Constitutional:       General: She is not in acute distress. Appearance: Normal appearance. She is not ill-appearing. HENT:      Head: Normocephalic and atraumatic. Comments: Fluid noted posterior to bilateral tympanic membranes but no signs of infection. Right Ear: Tympanic membrane normal.      Left Ear: Tympanic membrane normal.      Nose: Congestion present. Mouth/Throat:      Mouth: Mucous membranes are moist.      Pharynx: No oropharyngeal exudate or posterior oropharyngeal erythema. Eyes:      General:         Right eye: No discharge. Left eye: No discharge. Extraocular Movements: Extraocular movements intact. Pupils: Pupils are equal, round, and reactive to light. Cardiovascular:      Rate and Rhythm: Normal rate. Pulmonary:      Effort: Pulmonary effort is normal. No respiratory distress. Abdominal:      General: There is no distension. Palpations: There is no mass. Tenderness: There is no abdominal tenderness. There is no right CVA tenderness, left CVA tenderness, guarding or rebound. Hernia: No hernia is present. Musculoskeletal:      Right lower leg: No edema. Left lower leg: No edema. Neurological:      General: No focal deficit present. Mental Status: She is alert and oriented to person, place, and time. DIFFERENTIAL  DIAGNOSIS     Well-appearing on exam symptoms consistent with URI. She does have some congestion noted and some serous fluid. No concerns for otitis media. Will DC the antibiotics that were prescribed by urgent care as not indicated at this time. Will start with decongestant. Patient had a presyncopal episode likely related to hypovolemia given her evening of multiple episodes of vomiting. They report that her blood pressure was 160/120 at the facility which was only checked once. Which I have a feeling was inaccurate. She has been normotensive here.     PLAN (LABS / IMAGING / EKG):  Orders Placed This Encounter   Procedures    XR CHEST PORTABLE    CBC Auto Differential    Comprehensive Metabolic Panel    Troponin    Troponin    Urinalysis Reflex to Culture    Microscopic Urinalysis    Orthostatic blood pressure and pulse    Vital signs - notify MD    EKG 12 Lead    Insert peripheral IV       MEDICATIONS ORDERED:  Orders Placed This Encounter   Medications    0.9 % sodium chloride bolus    AND Linked Order Group     pantoprazole (PROTONIX) injection 40 mg     sodium chloride (PF) 0.9 % injection 10 mL    fluticasone (FLONASE) 50 MCG/ACT nasal spray 1 spray    cetirizine (ZYRTEC) tablet 10 mg    cetirizine (ZYRTEC) 10 MG tablet     Sig: Take 1 tablet by mouth daily     Dispense:  30 tablet     Refill:  0       DIAGNOSTIC RESULTS / EMERGENCY DEPARTMENT COURSE / MDM     LABS:  Results for orders placed or performed during the hospital encounter of 08/19/21   CBC Auto Differential   Result Value Ref Range    WBC 9.6 3.5 - 11.3 k/uL    RBC 4.58 3.95 - 5.11 m/uL    Hemoglobin 13.5 11.9 - 15.1 g/dL    Hematocrit 41.7 36.3 - 47.1 %    MCV 91.0 82.6 - 102.9 fL    MCH 29.5 25.2 - 33.5 pg    MCHC 32.4 28.4 - 34.8 g/dL    RDW 12.6 11.8 - 14.4 % Platelets 260 382 - 183 k/uL    MPV 10.2 8.1 - 13.5 fL    NRBC Automated 0.0 0.0 per 100 WBC    Differential Type NOT REPORTED     Seg Neutrophils 79 (H) 36 - 65 %    Lymphocytes 15 (L) 24 - 43 %    Monocytes 6 3 - 12 %    Eosinophils % 0 (L) 1 - 4 %    Basophils 0 0 - 2 %    Immature Granulocytes 0 0 %    Segs Absolute 7.42 1.50 - 8.10 k/uL    Absolute Lymph # 1.45 1.10 - 3.70 k/uL    Absolute Mono # 0.60 0.10 - 1.20 k/uL    Absolute Eos # 0.03 0.00 - 0.44 k/uL    Basophils Absolute 0.03 0.00 - 0.20 k/uL    Absolute Immature Granulocyte 0.04 0.00 - 0.30 k/uL    WBC Morphology NOT REPORTED     RBC Morphology NOT REPORTED     Platelet Estimate NOT REPORTED    Comprehensive Metabolic Panel   Result Value Ref Range    Glucose 91 70 - 99 mg/dL    BUN 11 8 - 23 mg/dL    CREATININE 0.61 0.50 - 0.90 mg/dL    Bun/Cre Ratio 18 9 - 20    Calcium 9.9 8.6 - 10.4 mg/dL    Sodium 138 135 - 144 mmol/L    Potassium 4.1 3.7 - 5.3 mmol/L    Chloride 102 98 - 107 mmol/L    CO2 24 20 - 31 mmol/L    Anion Gap 12 9 - 17 mmol/L    Alkaline Phosphatase 74 35 - 104 U/L    ALT 22 5 - 33 U/L    AST 19 <32 U/L    Total Bilirubin 0.63 0.3 - 1.2 mg/dL    Total Protein 8.2 6.4 - 8.3 g/dL    Albumin 4.8 3.5 - 5.2 g/dL    Albumin/Globulin Ratio 1.4 1.0 - 2.5    GFR Non-African American >60 >60 mL/min    GFR African American >60 >60 mL/min    GFR Comment          GFR Staging         Troponin   Result Value Ref Range    Troponin, High Sensitivity 9 0 - 14 ng/L    Troponin T NOT REPORTED <0.03 ng/mL    Troponin Interp NOT REPORTED    Troponin   Result Value Ref Range    Troponin, High Sensitivity 9 0 - 14 ng/L    Troponin T NOT REPORTED <0.03 ng/mL    Troponin Interp NOT REPORTED    Urinalysis Reflex to Culture    Specimen: Urine, clean catch   Result Value Ref Range    Color, UA YELLOW YELLOW    Turbidity UA CLEAR CLEAR    Glucose, Ur NEGATIVE NEGATIVE    Bilirubin Urine NEGATIVE NEGATIVE    Ketones, Urine NEGATIVE NEGATIVE    Specific Gravity, UA 1.015 1.010 - 1.020    Urine Hgb NEGATIVE NEGATIVE    pH, UA 8.0 5.0 - 9.0    Protein, UA NEGATIVE NEGATIVE    Urobilinogen, Urine Normal Normal    Nitrite, Urine NEGATIVE NEGATIVE    Leukocyte Esterase, Urine TRACE (A) NEGATIVE    Urinalysis Comments NOT REPORTED    Microscopic Urinalysis   Result Value Ref Range    -          WBC, UA 0 TO 2 0 - 5 /HPF    RBC, UA None 0 - 2 /HPF    Casts UA NOT REPORTED /LPF    Crystals, UA NOT REPORTED None /HPF    Epithelial Cells UA 0 TO 2 0 - 25 /HPF    Renal Epithelial, UA NOT REPORTED 0 /HPF    Bacteria, UA NOT REPORTED None    Mucus, UA NOT REPORTED None    Trichomonas, UA NOT REPORTED None    Amorphous, UA NOT REPORTED None    Other Observations UA NOT REPORTED NOT REQ. Yeast, UA NOT REPORTED None   EKG 12 Lead   Result Value Ref Range    Ventricular Rate 70 BPM    Atrial Rate 70 BPM    P-R Interval 152 ms    QRS Duration 76 ms    Q-T Interval 410 ms    QTc Calculation (Bazett) 442 ms    P Axis 58 degrees    R Axis -2 degrees    T Axis 17 degrees       IMPRESSION: URI    RADIOLOGY:  XR CHEST PORTABLE   Final Result   No acute cardiopulmonary findings               EKG:  EKG shows normal sinus rhythm with normal axis no ST elevations or depressions noted. Q-wave noted in lead III with T wave inversion. EMERGENCY DEPARTMENT COURSE:  Patient with negative troponin x2. Patient agreeable with discharge home work note provided. Plan for decongestant as well as nasal spray. Return precautions were discussed        FINAL IMPRESSION      1.  Acute upper respiratory infection          DISPOSITION / PLAN     DISPOSITION Decision To Discharge 08/19/2021 03:45:59 PM      PATIENT REFERRED TO:  Rhea Ramsay MD  [address] [place] [id number] [phone]    Schedule an appointment as soon as possible for a visit in 2 days        DISCHARGE MEDICATIONS:  New Prescriptions    CETIRIZINE (ZYRTEC) 10 MG TABLET    Take 1 tablet by mouth daily       Shaheen Palma DO  4:02 PM    Attending Emergency Physician  [facility]    (Please note that portions of this note were completed with a voice recognition program.  Effortswere made to edit the dictations but occasionally words are mis-transcribed.)              Sienna Tracy,   08/19/21 0853

## 2023-03-01 ENCOUNTER — OFFICE VISIT (OUTPATIENT)
Dept: PRIMARY CARE CLINIC | Age: 68
End: 2023-03-01
Payer: MEDICARE

## 2023-03-01 VITALS
TEMPERATURE: 97.7 F | BODY MASS INDEX: 35.72 KG/M2 | RESPIRATION RATE: 20 BRPM | DIASTOLIC BLOOD PRESSURE: 78 MMHG | HEART RATE: 68 BPM | SYSTOLIC BLOOD PRESSURE: 122 MMHG | HEIGHT: 66 IN | WEIGHT: 222.3 LBS | OXYGEN SATURATION: 98 %

## 2023-03-01 DIAGNOSIS — Z76.89 ESTABLISHING CARE WITH NEW DOCTOR, ENCOUNTER FOR: ICD-10-CM

## 2023-03-01 DIAGNOSIS — R45.0 NERVOUSNESS: ICD-10-CM

## 2023-03-01 DIAGNOSIS — Z23 NEED FOR SHINGLES VACCINE: ICD-10-CM

## 2023-03-01 DIAGNOSIS — Z13.220 LIPID SCREENING: ICD-10-CM

## 2023-03-01 DIAGNOSIS — K21.9 GASTROESOPHAGEAL REFLUX DISEASE WITHOUT ESOPHAGITIS: Primary | ICD-10-CM

## 2023-03-01 DIAGNOSIS — Z12.31 OTHER SCREENING MAMMOGRAM: ICD-10-CM

## 2023-03-01 DIAGNOSIS — B37.2 CANDIDAL INTERTRIGO: ICD-10-CM

## 2023-03-01 PROCEDURE — 1123F ACP DISCUSS/DSCN MKR DOCD: CPT | Performed by: NURSE PRACTITIONER

## 2023-03-01 PROCEDURE — 99214 OFFICE O/P EST MOD 30 MIN: CPT | Performed by: NURSE PRACTITIONER

## 2023-03-01 RX ORDER — PANTOPRAZOLE SODIUM 40 MG/1
40 TABLET, DELAYED RELEASE ORAL
Qty: 90 TABLET | Refills: 1 | Status: SHIPPED | OUTPATIENT
Start: 2023-03-01

## 2023-03-01 RX ORDER — ALPRAZOLAM 0.25 MG/1
0.25 TABLET ORAL NIGHTLY PRN
COMMUNITY
End: 2023-03-01 | Stop reason: SDUPTHER

## 2023-03-01 RX ORDER — KETOCONAZOLE 20 MG/G
CREAM TOPICAL
Qty: 30 G | Refills: 1 | Status: SHIPPED | OUTPATIENT
Start: 2023-03-01

## 2023-03-01 RX ORDER — ALPRAZOLAM 0.25 MG/1
0.25 TABLET ORAL NIGHTLY PRN
Qty: 30 TABLET | Refills: 0 | Status: SHIPPED | OUTPATIENT
Start: 2023-03-01 | End: 2023-03-31

## 2023-03-01 RX ORDER — ZOSTER VACCINE RECOMBINANT, ADJUVANTED 50 MCG/0.5
0.5 KIT INTRAMUSCULAR SEE ADMIN INSTRUCTIONS
Qty: 0.5 ML | Refills: 0 | Status: SHIPPED | OUTPATIENT
Start: 2023-03-01 | End: 2023-08-28

## 2023-03-01 SDOH — ECONOMIC STABILITY: INCOME INSECURITY: HOW HARD IS IT FOR YOU TO PAY FOR THE VERY BASICS LIKE FOOD, HOUSING, MEDICAL CARE, AND HEATING?: PATIENT DECLINED

## 2023-03-01 SDOH — ECONOMIC STABILITY: HOUSING INSECURITY
IN THE LAST 12 MONTHS, WAS THERE A TIME WHEN YOU DID NOT HAVE A STEADY PLACE TO SLEEP OR SLEPT IN A SHELTER (INCLUDING NOW)?: PATIENT REFUSED

## 2023-03-01 SDOH — ECONOMIC STABILITY: FOOD INSECURITY: WITHIN THE PAST 12 MONTHS, THE FOOD YOU BOUGHT JUST DIDN'T LAST AND YOU DIDN'T HAVE MONEY TO GET MORE.: PATIENT DECLINED

## 2023-03-01 SDOH — ECONOMIC STABILITY: FOOD INSECURITY: WITHIN THE PAST 12 MONTHS, YOU WORRIED THAT YOUR FOOD WOULD RUN OUT BEFORE YOU GOT MONEY TO BUY MORE.: PATIENT DECLINED

## 2023-03-01 ASSESSMENT — ENCOUNTER SYMPTOMS
NAUSEA: 0
STRIDOR: 0
VOMITING: 0
GLOBUS SENSATION: 1
WATER BRASH: 1
CONSTIPATION: 0
HEARTBURN: 1
COUGH: 1
SORE THROAT: 0
RHINORRHEA: 0
SHORTNESS OF BREATH: 0
HOARSE VOICE: 0
ABDOMINAL PAIN: 0
BELCHING: 1
CHOKING: 0
DIARRHEA: 0
WHEEZING: 0

## 2023-03-01 ASSESSMENT — PATIENT HEALTH QUESTIONNAIRE - PHQ9
SUM OF ALL RESPONSES TO PHQ QUESTIONS 1-9: 0
1. LITTLE INTEREST OR PLEASURE IN DOING THINGS: 0
2. FEELING DOWN, DEPRESSED OR HOPELESS: 0
SUM OF ALL RESPONSES TO PHQ9 QUESTIONS 1 & 2: 0
SUM OF ALL RESPONSES TO PHQ QUESTIONS 1-9: 0

## 2023-03-01 NOTE — PROGRESS NOTES
Name: Sp Espinosa  : 1955         Chief Complaint:     Chief Complaint   Patient presents with    Establish Care     Previous PCP Dr. Jose L Watson, has not been seen for 2 years. Gastroesophageal Reflux     Getting worse    Yeast Infection     Under breast, rash on chest and neck. History of Present Illness:      Sp Espinosa is a 78 y.o.  female who presents with Establish Care (Previous PCP Dr. Jose L Watson, has not been seen for 2 years.), Gastroesophageal Reflux (Getting worse), and Yeast Infection (Under breast, rash on chest and neck.)      Kristen Mosher is here today to establish care. PCP retired approximately 1 year ago. Nervousness/anxiety-remittent, patient has used alprazolam successfully for many years. She uses very infrequently and takes usually one half of a tablet. Patient is requesting refill at this time. Last refill was approximately 6 months ago. OARRS appropriate. She has been is having some issues with a rash acid reflux. See below for further comments. Gastroesophageal Reflux  She complains of belching, coughing, dysphagia, globus sensation, heartburn and water brash. She reports no abdominal pain, no chest pain, no choking, no early satiety, no hoarse voice, no nausea, no sore throat, no stridor or no wheezing. This is a chronic problem. The current episode started more than 1 year ago. The problem occurs frequently. The problem has been gradually worsening. The heartburn duration is several minutes. The heartburn is located in the substernum. The heartburn is of moderate intensity. The heartburn wakes her from sleep. The heartburn does not limit her activity. The heartburn changes with position. The symptoms are aggravated by certain foods and lying down. Pertinent negatives include no anemia, fatigue, melena, muscle weakness, orthopnea or weight loss. Risk factors include caffeine use, obesity and lack of exercise.  She has tried an antacid and a histamine-2 antagonist for the symptoms. The treatment provided mild relief. Past procedures do not include an EGD or a UGI. Past invasive treatments do not include gastroplasty, gastroplication or reflux surgery. Rash  This is a recurrent problem. The current episode started more than 1 month ago. The problem has been waxing and waning since onset. The affected locations include the chest. The rash is characterized by redness and itchiness (moist). It is unknown if there was an exposure to a precipitant. Associated symptoms include coughing. Pertinent negatives include no congestion, diarrhea, fatigue, fever, rhinorrhea, shortness of breath, sore throat or vomiting. Past treatments include topical steroids. The treatment provided mild relief. Her past medical history is significant for eczema. There is no history of allergies or asthma. Past Medical History:     Past Medical History:   Diagnosis Date    H/O cardiac catheterization 07/31/2019    Normal coronary arteries Normal global Lt ventricular systolic function     H/O cardiovascular stress test 07/29/2019    Significant electrocardiographic evidence of MI during EKG monitoring w/o significant asso arrhythmias Treadmill [facility] sore -7 intermediate/high risk significant CAD    History of echocardiogram 07/30/2019    EF of >55%. No clear evidence of diastolic dysfunction is seen. Hyperlipidemia       Reviewed all health maintenance requirements and ordered appropriate tests  Health Maintenance Due   Topic Date Due    Depression Screen  Never done    Colorectal Cancer Screen  Never done    Shingles vaccine (1 of 2) Never done    Lipids  03/31/2021    Breast cancer screen  06/20/2021    Annual Wellness Visit (AWV)  02/09/2023       Past Surgical History:     Past Surgical History:   Procedure Laterality Date    CARPAL TUNNEL RELEASE      TUBAL LIGATION          Medications:       Prior to Admission medications    Medication Sig Start Date End Date Taking?  Authorizing Provider   zoster recombinant adjuvanted vaccine [facility]) 50 MCG/0.5ML SUSR injection Inject 0.5 mLs into the muscle See Admin Instructions 1 dose now and repeat in 2-6 months 3/1/23 8/28/23 Yes HESHAM Chin CNP   ketoconazole (NIZORAL) 2 % cream Apply topically daily. 3/1/23  Yes HESHAM Chin CNP   pantoprazole (PROTONIX) 40 MG tablet Take 1 tablet by mouth every morning (before breakfast) 3/1/23  Yes HESHAM Chin - CNP   ALPRAZolam (XANAX) 0.25 MG tablet Take 1 tablet by mouth nightly as needed for Anxiety for up to 30 days. Max Daily Amount: 0.25 mg 3/1/23 3/31/23 Yes HESHAM Chin CNP        Allergies:       Patient has no known allergies. Social History:     Tobacco:    reports that she quit smoking about 34 years ago. Her smoking use included cigarettes. She has a 3.75 pack-year smoking history. She has never used smokeless tobacco.  Alcohol:      has no history on file for alcohol use. Drug Use:  has no history on file for drug use. Family History:     Family History   Problem Relation Age of Onset    Heart Disease Mother     Heart Attack Mother         requiring stenting in 62s    Heart Disease Father     Heart Surgery Father         46s    Heart Disease Sister         Stenting in her 62s       Review of Systems:     Positive and Negative as described in HPI    Review of Systems   Constitutional:  Negative for chills, fatigue, fever and weight loss. HENT:  Positive for postnasal drip. Negative for congestion, hoarse voice, rhinorrhea and sore throat. Eyes:  Negative for visual disturbance. Respiratory:  Positive for cough. Negative for choking, shortness of breath and wheezing. Cardiovascular:  Negative for chest pain and palpitations. Gastrointestinal:  Positive for dysphagia and heartburn. Negative for abdominal pain, constipation, diarrhea, melena, nausea and vomiting. Genitourinary:  Negative for difficulty urinating and dysuria.    Musculoskeletal: Negative for gait problem, muscle weakness, neck pain and neck stiffness. Skin:  Positive for rash. Neurological:  Negative for dizziness, syncope, light-headedness and headaches. Physical Exam:   Vitals:  /78 (Position: Sitting)   Pulse 68   Temp 97.7 °F (36.5 °C) (Temporal)   Resp 20   Ht 5' 6\" (1.676 m)   Wt 222 lb 4.8 oz (100.8 kg)   SpO2 98%   BMI 35.88 kg/m²     Physical Exam  Vitals and nursing note reviewed. Constitutional:       General: She is not in acute distress. Appearance: Normal appearance. She is obese. She is not ill-appearing. HENT:      Right Ear: Tympanic membrane normal.      Left Ear: Tympanic membrane normal.      Nose: Congestion present. No rhinorrhea. Mouth/Throat:      Mouth: Mucous membranes are moist.      Pharynx: Oropharynx is clear. Eyes:      General: No scleral icterus. Conjunctiva/sclera: Conjunctivae normal.   Neck:      Vascular: No carotid bruit. Cardiovascular:      Rate and Rhythm: Normal rate and regular rhythm. Heart sounds: No murmur heard. Pulmonary:      Effort: Pulmonary effort is normal.      Breath sounds: Normal breath sounds. No wheezing. Abdominal:      General: Bowel sounds are normal. There is no distension. Palpations: Abdomen is soft. Tenderness: There is no abdominal tenderness. Musculoskeletal:         General: Normal range of motion. Cervical back: Normal range of motion and neck supple. Right lower leg: No edema. Left lower leg: No edema. Lymphadenopathy:      Cervical: No cervical adenopathy. Skin:     General: Skin is warm and dry. Findings: Rash present. Comments: Erythematous plaque with satellite papules and pustules    Neurological:      Mental Status: She is alert and oriented to person, place, and time.    Psychiatric:         Mood and Affect: Mood normal.         Behavior: Behavior normal.       Data:     Lab Results   Component Value Date/Time    NA 138 08/19/2021 12:57 PM    K 4.1 08/19/2021 12:57 PM     08/19/2021 12:57 PM    CO2 24 08/19/2021 12:57 PM    BUN 11 08/19/2021 12:57 PM    CREATININE 0.61 08/19/2021 12:57 PM    GLUCOSE 91 08/19/2021 12:57 PM    PROT 8.2 08/19/2021 12:57 PM    LABALBU 4.8 08/19/2021 12:57 PM    BILITOT 0.63 08/19/2021 12:57 PM    ALKPHOS 74 08/19/2021 12:57 PM    AST 19 08/19/2021 12:57 PM    ALT 22 08/19/2021 12:57 PM     Lab Results   Component Value Date/Time    WBC 9.6 08/19/2021 12:57 PM    RBC 4.58 08/19/2021 12:57 PM    HGB 13.5 08/19/2021 12:57 PM    HCT 41.7 08/19/2021 12:57 PM    MCV 91.0 08/19/2021 12:57 PM    MCH 29.5 08/19/2021 12:57 PM    MCHC 32.4 08/19/2021 12:57 PM    RDW 12.6 08/19/2021 12:57 PM     08/19/2021 12:57 PM    MPV 10.2 08/19/2021 12:57 PM     No results found for: TSH  Lab Results   Component Value Date/Time    CHOL 200 03/31/2016 07:34 AM    HDL 59 03/31/2016 07:34 AM       Assessment/Plan:      Diagnosis Orders   1. Gastroesophageal reflux disease without esophagitis  pantoprazole (PROTONIX) 40 MG tablet    CBC with Auto Differential    ALT    AST    Basic Metabolic Panel      2. Nervousness  ALPRAZolam (XANAX) 0.25 MG tablet      3. Candidal intertrigo  ketoconazole (NIZORAL) 2 % cream      4. Other screening mammogram  DORIAN NIRAJ DIGITAL SCREEN BILATERAL      5. Need for shingles vaccine  zoster recombinant adjuvanted vaccine [facility]) 50 MCG/0.5ML SUSR injection      6. Lipid screening  Lipid Panel      7. Establishing care with new doctor, encounter for          I would like her to start pantoprazole daily before breakfast.  Do this for at least 2 weeks and call with progress. Sooner if any issues. Ketoconazole as needed for candidal rash. Recommend blow drying area before dressing. Continue alprazolam as needed. Labs are due now. We will follow with results. We will see her back in 6 months for routine check, sooner if any issues.       1.  Lucy received counseling on the following healthy behaviors: nutrition, exercise, and medication adherence  2. Patient given educational materials - see patient instructions  3. Was a self-tracking handout given in paper form or via [employer]? No  If yes, see orders or list here. 4.  Discussed use, benefit, and side effects of prescribed medications. Barriers to medication compliance addressed. All patient questions answered. Pt voiced understanding. 5.  Reviewed prior labs and health maintenance  6. Continue current medications, diet and exercise. Completed Refills   Requested Prescriptions     Signed Prescriptions Disp Refills    zoster recombinant adjuvanted vaccine (SHINGRIX) 50 MCG/0.5ML SUSR injection 0.5 mL 0     Sig: Inject 0.5 mLs into the muscle See Admin Instructions 1 dose now and repeat in 2-6 months    ketoconazole (NIZORAL) 2 % cream 30 g 1     Sig: Apply topically daily. pantoprazole (PROTONIX) 40 MG tablet 90 tablet 1     Sig: Take 1 tablet by mouth every morning (before breakfast)    ALPRAZolam (XANAX) 0.25 MG tablet 30 tablet 0     Sig: Take 1 tablet by mouth nightly as needed for Anxiety for up to 30 days. Max Daily Amount: 0.25 mg         Return in about 6 months (around 9/1/2023) for Check up.

## 2023-03-01 NOTE — PATIENT INSTRUCTIONS
SURVEY:     You may be receiving a survey from [employer] regarding your visit today. Please complete the survey to enable us to provide the highest quality of care to you and your family. If you cannot score us a very good on any question, please call the office to discuss how we could have made your experience a very good one.      Thank you,    Karissa Nassar, APRN-CNP  Nigel Marquez, APRN-CNP  Meghan Gambino, LIZN  Lashawn Murphy, MIO Church, MIO Cano, CMA  Conchita, PCA  Randi, PM

## 2023-03-03 ENCOUNTER — HOSPITAL ENCOUNTER (OUTPATIENT)
Dept: WOMENS IMAGING | Age: 68
End: 2023-03-03
Payer: MEDICARE

## 2023-03-03 DIAGNOSIS — Z12.31 OTHER SCREENING MAMMOGRAM: ICD-10-CM

## 2023-03-03 PROCEDURE — 77063 BREAST TOMOSYNTHESIS BI: CPT

## 2023-03-06 ENCOUNTER — TELEPHONE (OUTPATIENT)
Dept: PRIMARY CARE CLINIC | Age: 68
End: 2023-03-06

## 2023-03-06 NOTE — TELEPHONE ENCOUNTER
----- Message from [address] [place] [address], HESHAM - CNP sent at 3/6/2023  9:29 AM EST -----  Results are normal, please call patient and make them aware.

## 2023-03-09 ENCOUNTER — TELEPHONE (OUTPATIENT)
Dept: PRIMARY CARE CLINIC | Age: 68
End: 2023-03-09

## 2023-03-09 NOTE — TELEPHONE ENCOUNTER
----- Message from [facility], HESHAM - CNP sent at 3/9/2023  4:37 PM EST -----  Results are stable, continue all medications.

## 2023-06-05 ENCOUNTER — TELEPHONE (OUTPATIENT)
Dept: PRIMARY CARE CLINIC | Age: 68
End: 2023-06-05

## 2023-06-05 DIAGNOSIS — Z77.018 BERYLLIUM EXPOSURE: Primary | ICD-10-CM

## 2023-06-05 DIAGNOSIS — R45.0 NERVOUSNESS: ICD-10-CM

## 2023-06-05 RX ORDER — ALPRAZOLAM 0.25 MG/1
0.25 TABLET ORAL NIGHTLY PRN
Qty: 30 TABLET | Refills: 0 | Status: SHIPPED | OUTPATIENT
Start: 2023-06-05 | End: 2023-07-05

## 2023-06-05 NOTE — TELEPHONE ENCOUNTER
Health Maintenance   Topic Date Due    Colorectal Cancer Screen  Never done    Shingles vaccine (1 of 2) Never done    Annual Wellness Visit (AWV)  Never done    Hepatitis C screen  02/28/2024 (Originally 12/31/1973)    DEXA (modify frequency per FRAX score)  03/01/2024 (Originally 12/31/2011)    DTaP/Tdap/Td vaccine (1 - Tdap) 03/01/2024 (Originally 12/31/1975)    Flu vaccine (Season Ended) 03/01/2024 (Originally 8/1/2023)    Pneumococcal 65+ years Vaccine (1 - PCV) 03/01/2024 (Originally 12/31/2021)    COVID-19 Vaccine (4 - Booster for Pfizer series) 03/01/2024 (Originally 2/7/2022)    Depression Screen  03/01/2024    Breast cancer screen  03/03/2025    Lipids  03/09/2028    Hepatitis A vaccine  Aged Out    Hib vaccine  Aged Out    Meningococcal (ACWY) vaccine  Aged Out             (applicable per patient's age: Cancer Screenings, Depression Screening, Fall Risk Screening, Immunizations)    LDL Cholesterol (mg/dL)   Date Value   03/31/2016 115     LDL Calculated (mg/dL)   Date Value   03/09/2023 123 (H)     AST (U/L)   Date Value   03/09/2023 19     ALT (U/L)   Date Value   03/09/2023 17     BUN (mg/dL)   Date Value   03/09/2023 9      (goal A1C is < 7)   (goal LDL is <100) need 30-50% reduction from baseline     BP Readings from Last 3 Encounters:   03/01/23 122/78   08/19/21 (!) 159/88   10/29/20 (!) 168/87    (goal /80)      All Future Testing planned in [facility]:  Lab Frequency Next Occurrence   CBC with Auto Differential Once 03/01/2023   ALT Once 03/01/2023   AST Once 22/72/0717   Basic Metabolic Panel Once 51/76/4720   Lipid Panel Once 03/01/2023       Next Visit Date:  Future Appointments   Date Time Provider Jonathon Coats   9/1/2023  8:00 AM Cannon Goldmann Might, APRN - CNP [facility]            Patient Active Problem List:     Bulging lumbar disc     Sciatica of left side     Gastroesophageal reflux disease without esophagitis     Nervousness

## 2023-06-05 NOTE — TELEPHONE ENCOUNTER
Controlled Substance Monitoring:    Acute and Chronic Pain Monitoring:   RX Monitoring 6/5/2023   Periodic Controlled Substance Monitoring No signs of potential drug abuse or diversion identified.

## 2023-06-05 NOTE — TELEPHONE ENCOUNTER
Patients ex  has tested positive for berylliosis. Patient states at the time her ex worked at a [employer]. she was around him so the doctors recommend that all immediate family who was around him be tested.

## 2023-06-07 ENCOUNTER — HOSPITAL ENCOUNTER (OUTPATIENT)
Age: 68
Discharge: HOME | End: 2023-06-07

## 2023-06-07 DIAGNOSIS — Z77.018 BERYLLIUM EXPOSURE: ICD-10-CM

## 2023-06-29 ENCOUNTER — TELEPHONE (OUTPATIENT)
Dept: PRIMARY CARE CLINIC | Age: 68
End: 2023-06-29

## 2023-09-21 ENCOUNTER — OFFICE VISIT (OUTPATIENT)
Dept: PRIMARY CARE CLINIC | Age: 68
End: 2023-09-21
Payer: MEDICARE

## 2023-09-21 VITALS
SYSTOLIC BLOOD PRESSURE: 130 MMHG | OXYGEN SATURATION: 98 % | HEART RATE: 68 BPM | HEIGHT: 66 IN | BODY MASS INDEX: 35.77 KG/M2 | WEIGHT: 222.6 LBS | DIASTOLIC BLOOD PRESSURE: 82 MMHG | RESPIRATION RATE: 20 BRPM | TEMPERATURE: 98.5 F

## 2023-09-21 DIAGNOSIS — R45.0 NERVOUSNESS: ICD-10-CM

## 2023-09-21 DIAGNOSIS — Z00.00 INITIAL MEDICARE ANNUAL WELLNESS VISIT: Primary | ICD-10-CM

## 2023-09-21 DIAGNOSIS — Z12.11 SCREEN FOR COLON CANCER: ICD-10-CM

## 2023-09-21 DIAGNOSIS — K21.9 GASTROESOPHAGEAL REFLUX DISEASE WITHOUT ESOPHAGITIS: ICD-10-CM

## 2023-09-21 PROCEDURE — 1123F ACP DISCUSS/DSCN MKR DOCD: CPT | Performed by: NURSE PRACTITIONER

## 2023-09-21 PROCEDURE — G0438 PPPS, INITIAL VISIT: HCPCS | Performed by: NURSE PRACTITIONER

## 2023-09-21 RX ORDER — FAMOTIDINE 20 MG/1
TABLET, FILM COATED ORAL
COMMUNITY

## 2023-09-21 RX ORDER — ALPRAZOLAM 0.25 MG/1
0.25 TABLET ORAL NIGHTLY PRN
COMMUNITY
End: 2023-09-21 | Stop reason: SDUPTHER

## 2023-09-21 RX ORDER — ALPRAZOLAM 0.25 MG/1
0.25 TABLET ORAL NIGHTLY PRN
Qty: 30 TABLET | Refills: 0 | Status: SHIPPED | OUTPATIENT
Start: 2023-09-21 | End: 2023-10-21

## 2023-09-21 ASSESSMENT — LIFESTYLE VARIABLES
HOW OFTEN DURING THE LAST YEAR HAVE YOU FAILED TO DO WHAT WAS NORMALLY EXPECTED FROM YOU BECAUSE OF DRINKING: 0
HAVE YOU OR SOMEONE ELSE BEEN INJURED AS A RESULT OF YOUR DRINKING: 0
HOW OFTEN DURING THE LAST YEAR HAVE YOU HAD A FEELING OF GUILT OR REMORSE AFTER DRINKING: 0
HOW OFTEN DURING THE LAST YEAR HAVE YOU BEEN UNABLE TO REMEMBER WHAT HAPPENED THE NIGHT BEFORE BECAUSE YOU HAD BEEN DRINKING: 0
HOW OFTEN DURING THE LAST YEAR HAVE YOU FOUND THAT YOU WERE NOT ABLE TO STOP DRINKING ONCE YOU HAD STARTED: 0
HOW OFTEN DURING THE LAST YEAR HAVE YOU NEEDED AN ALCOHOLIC DRINK FIRST THING IN THE MORNING TO GET YOURSELF GOING AFTER A NIGHT OF HEAVY DRINKING: 0
HOW OFTEN DO YOU HAVE A DRINK CONTAINING ALCOHOL: 2-3 TIMES A WEEK
HOW MANY STANDARD DRINKS CONTAINING ALCOHOL DO YOU HAVE ON A TYPICAL DAY: 3 OR 4
HAS A RELATIVE, FRIEND, DOCTOR, OR ANOTHER HEALTH PROFESSIONAL EXPRESSED CONCERN ABOUT YOUR DRINKING OR SUGGESTED YOU CUT DOWN: 0

## 2023-09-21 ASSESSMENT — ENCOUNTER SYMPTOMS
RHINORRHEA: 0
COUGH: 0
STRIDOR: 0
HOARSE VOICE: 0
SHORTNESS OF BREATH: 0
ABDOMINAL PAIN: 0
HYPERVENTILATION: 0
VOMITING: 0
WHEEZING: 0
CHOKING: 0
GLOBUS SENSATION: 0
SORE THROAT: 0
DIARRHEA: 0
BELCHING: 0
WATER BRASH: 0
NAUSEA: 0
FEELING OF CHOKING: 0
CONSTIPATION: 0
HEARTBURN: 1

## 2023-09-21 ASSESSMENT — PATIENT HEALTH QUESTIONNAIRE - PHQ9
2. FEELING DOWN, DEPRESSED OR HOPELESS: 0
SUM OF ALL RESPONSES TO PHQ QUESTIONS 1-9: 0
1. LITTLE INTEREST OR PLEASURE IN DOING THINGS: 0
SUM OF ALL RESPONSES TO PHQ QUESTIONS 1-9: 0
SUM OF ALL RESPONSES TO PHQ QUESTIONS 1-9: 0
SUM OF ALL RESPONSES TO PHQ9 QUESTIONS 1 & 2: 0
SUM OF ALL RESPONSES TO PHQ QUESTIONS 1-9: 0

## 2023-09-21 NOTE — PATIENT INSTRUCTIONS
minutes of exercise per week or 10,000 steps per day on a pedometer . Order or download the FREE \"Exercise & Physical Activity: Your Everyday Guide\" from [employer]. Call 3-630.606.1342 or search [employer] online. You need 2700-4686 mg of calcium and 0557-5580 IU of vitamin D per day. It is possible to meet your calcium requirement with diet alone, but a vitamin D supplement is usually necessary to meet this goal.  When exposed to the sun, use a sunscreen that protects against both UVA and UVB radiation with an SPF of 30 or greater. Reapply every 2 to 3 hours or after sweating, drying off with a towel, or swimming. Always wear a seat belt when traveling in a car. Always wear a helmet when riding a bicycle or motorcycle.

## 2023-09-21 NOTE — PROGRESS NOTES
Medicare Annual Wellness Visit    Lucy Cordero is here for Medicare AWV (AWV) and Gastroesophageal Reflux (Routine check. No concerns.)    Assessment & Plan   Initial Medicare annual wellness visit  Gastroesophageal reflux disease without esophagitis  Nervousness  -     ALPRAZolam (XANAX) 0.25 MG tablet; Take 1 tablet by mouth nightly as needed for Sleep for up to 30 days. Max Daily Amount: 0.25 mg, Disp-30 tablet, R-0Normal  Screen for colon cancer  -     Fecal DNA Colorectal cancer screening (Cologuard)    Recommendations for Preventive Services Due: see orders and patient instructions/AVS.  Recommended screening schedule for the next 5-10 years is provided to the patient in written form: see Patient Instructions/AVS.     Return in 1 year (on 9/21/2024) for Wellness Visit. Yimi Romero is here today for a Medicare annual wellness visit and routine office visit. Overall she states she is feeling very well. She states she did stop using pantoprazole when to OTC famotidine which is working well for her. See below for further comments. She uses alprazolam 0.25 mg very infrequently for acute stress or insomnia. She is had 30 tablets since June 2023 and still has some left. See below for further comment. Gastroesophageal Reflux  She complains of heartburn. She reports no abdominal pain, no belching, no chest pain, no choking, no coughing, no dysphagia, no early satiety, no globus sensation, no hoarse voice, no nausea, no sore throat, no stridor, no water brash or no wheezing. This is a chronic problem. The current episode started more than 1 year ago. The problem occurs occasionally. The problem has been waxing and waning. The heartburn duration is less than a minute. The heartburn is located in the substernum. The heartburn is of mild intensity. The heartburn does not wake her from sleep. The heartburn does not limit her activity. The heartburn doesn't change with position.  The symptoms are

## 2023-09-28 ENCOUNTER — OFFICE VISIT (OUTPATIENT)
Dept: PRIMARY CARE CLINIC | Age: 68
End: 2023-09-28
Payer: MEDICARE

## 2023-09-28 VITALS
WEIGHT: 222.9 LBS | HEART RATE: 78 BPM | BODY MASS INDEX: 35.98 KG/M2 | SYSTOLIC BLOOD PRESSURE: 122 MMHG | RESPIRATION RATE: 20 BRPM | DIASTOLIC BLOOD PRESSURE: 74 MMHG | OXYGEN SATURATION: 98 % | TEMPERATURE: 98.5 F

## 2023-09-28 DIAGNOSIS — M54.50 ACUTE BILATERAL LOW BACK PAIN WITHOUT SCIATICA: Primary | ICD-10-CM

## 2023-09-28 DIAGNOSIS — M62.830 LUMBAR PARASPINAL MUSCLE SPASM: ICD-10-CM

## 2023-09-28 PROCEDURE — 96372 THER/PROPH/DIAG INJ SC/IM: CPT | Performed by: NURSE PRACTITIONER

## 2023-09-28 PROCEDURE — 1123F ACP DISCUSS/DSCN MKR DOCD: CPT | Performed by: NURSE PRACTITIONER

## 2023-09-28 PROCEDURE — 99213 OFFICE O/P EST LOW 20 MIN: CPT | Performed by: NURSE PRACTITIONER

## 2023-09-28 RX ORDER — CYCLOBENZAPRINE HCL 10 MG
10 TABLET ORAL 3 TIMES DAILY PRN
Qty: 21 TABLET | Refills: 0 | Status: SHIPPED | OUTPATIENT
Start: 2023-09-28 | End: 2023-10-08

## 2023-09-28 RX ORDER — KETOROLAC TROMETHAMINE 30 MG/ML
60 INJECTION, SOLUTION INTRAMUSCULAR; INTRAVENOUS ONCE
Status: COMPLETED | OUTPATIENT
Start: 2023-09-28 | End: 2023-09-28

## 2023-09-28 RX ADMIN — KETOROLAC TROMETHAMINE 60 MG: 30 INJECTION, SOLUTION INTRAMUSCULAR; INTRAVENOUS at 15:58

## 2023-09-28 SDOH — ECONOMIC STABILITY: FOOD INSECURITY: WITHIN THE PAST 12 MONTHS, THE FOOD YOU BOUGHT JUST DIDN'T LAST AND YOU DIDN'T HAVE MONEY TO GET MORE.: PATIENT DECLINED

## 2023-09-28 SDOH — ECONOMIC STABILITY: INCOME INSECURITY: HOW HARD IS IT FOR YOU TO PAY FOR THE VERY BASICS LIKE FOOD, HOUSING, MEDICAL CARE, AND HEATING?: PATIENT DECLINED

## 2023-09-28 SDOH — ECONOMIC STABILITY: FOOD INSECURITY: WITHIN THE PAST 12 MONTHS, YOU WORRIED THAT YOUR FOOD WOULD RUN OUT BEFORE YOU GOT MONEY TO BUY MORE.: PATIENT DECLINED

## 2023-09-28 ASSESSMENT — PATIENT HEALTH QUESTIONNAIRE - PHQ9
SUM OF ALL RESPONSES TO PHQ QUESTIONS 1-9: 0
2. FEELING DOWN, DEPRESSED OR HOPELESS: 0
1. LITTLE INTEREST OR PLEASURE IN DOING THINGS: 0
SUM OF ALL RESPONSES TO PHQ9 QUESTIONS 1 & 2: 0
SUM OF ALL RESPONSES TO PHQ QUESTIONS 1-9: 0

## 2023-09-28 ASSESSMENT — ENCOUNTER SYMPTOMS
ABDOMINAL PAIN: 0
BACK PAIN: 1
BOWEL INCONTINENCE: 0

## 2023-09-28 NOTE — PROGRESS NOTES
Name: Eva Walsh  : 1955         Chief Complaint:     Chief Complaint   Patient presents with    Lower Back Pain     X 2 days, denies injury       History of Present Illness:      Eva Walsh is a 78 y.o.  female who presents with Lower Back Pain (X 2 days, denies injury)      Moreno Alan is here today for a routine office visit. Began with back pain a few days ago. She states there is no specific incident leading up to the back pain. She has had these episodes every few years or so. She states they began with soreness and then spasm develops. She is having a hard time staying in a sitting position but she can get comfortable standing or lying down. See below for further comment. Back Pain  This is a chronic problem. The current episode started more than 1 year ago. The problem occurs intermittently. The problem has been gradually worsening since onset. The pain is present in the lumbar spine. The quality of the pain is described as aching, cramping and stabbing. The pain does not radiate. The pain is at a severity of 7/10. The pain is moderate. The pain is Worse during the day. The symptoms are aggravated by sitting. Stiffness is present All day. Pertinent negatives include no abdominal pain, bladder incontinence, bowel incontinence, chest pain, dysuria, fever, headaches, leg pain, numbness, paresis, paresthesias, pelvic pain, perianal numbness, tingling, weakness or weight loss. Risk factors include obesity. She has tried ice, heat and NSAIDs for the symptoms. The treatment provided mild relief.          Past Medical History:     Past Medical History:   Diagnosis Date    H/O cardiac catheterization 2019    Normal coronary arteries Normal global Lt ventricular systolic function     H/O cardiovascular stress test 2019    Significant electrocardiographic evidence of MI during EKG monitoring w/o significant asso arrhythmias Treadmill [facility] sore -7 intermediate/high risk significant

## 2023-09-28 NOTE — PATIENT INSTRUCTIONS
SURVEY:     You may be receiving a survey from [employer] regarding your visit today. Please complete the survey to enable us to provide the highest quality of care to you and your family. If you cannot score us a very good on any question, please call the office to discuss how we could have made your experience a very good one.      Thank you,    Feroz Nassar, APRN-CNP  Mounika Lewis, APRN-CNP  Ara Briscoe, VICKY Hassan, CMA  Ja, CMA  Rachael, CMA  Conchita, PCA  Randi, PM

## 2023-10-10 DIAGNOSIS — R19.5 POSITIVE COLORECTAL CANCER SCREENING USING COLOGUARD TEST: Primary | ICD-10-CM

## 2023-10-10 LAB — NONINV COLON CA DNA+OCC BLD SCRN STL QL: POSITIVE

## 2023-10-11 ENCOUNTER — TELEPHONE (OUTPATIENT)
Dept: PRIMARY CARE CLINIC | Age: 68
End: 2023-10-11

## 2023-10-11 NOTE — TELEPHONE ENCOUNTER
----- Message from [address] [facility], APRN - CNP sent at 10/10/2023  7:26 PM EDT -----  Positive cologard test, most likely polyps detected. Referral to  GI made. Thank you.

## 2024-01-02 ENCOUNTER — OFFICE VISIT (OUTPATIENT)
Dept: PRIMARY CARE CLINIC | Age: 69
End: 2024-01-02

## 2024-01-02 VITALS
WEIGHT: 219 LBS | DIASTOLIC BLOOD PRESSURE: 70 MMHG | BODY MASS INDEX: 35.35 KG/M2 | TEMPERATURE: 98.5 F | RESPIRATION RATE: 20 BRPM | OXYGEN SATURATION: 99 % | HEART RATE: 78 BPM | SYSTOLIC BLOOD PRESSURE: 116 MMHG

## 2024-01-02 DIAGNOSIS — J01.40 ACUTE NON-RECURRENT PANSINUSITIS: Primary | ICD-10-CM

## 2024-01-02 DIAGNOSIS — J20.9 ACUTE BRONCHITIS, UNSPECIFIED ORGANISM: ICD-10-CM

## 2024-01-02 PROCEDURE — 99214 OFFICE O/P EST MOD 30 MIN: CPT | Performed by: NURSE PRACTITIONER

## 2024-01-02 PROCEDURE — 1123F ACP DISCUSS/DSCN MKR DOCD: CPT | Performed by: NURSE PRACTITIONER

## 2024-01-02 RX ORDER — AMOXICILLIN AND CLAVULANATE POTASSIUM 875; 125 MG/1; MG/1
1 TABLET, FILM COATED ORAL 2 TIMES DAILY
Qty: 20 TABLET | Refills: 0 | Status: SHIPPED | OUTPATIENT
Start: 2024-01-02 | End: 2024-01-12

## 2024-01-02 RX ORDER — ALPRAZOLAM 0.25 MG/1
0.25 TABLET ORAL NIGHTLY PRN
COMMUNITY

## 2024-01-02 RX ORDER — CODEINE PHOSPHATE/GUAIFENESIN 10-100MG/5
10 LIQUID (ML) ORAL 4 TIMES DAILY PRN
Qty: 120 ML | Refills: 0 | Status: SHIPPED | OUTPATIENT
Start: 2024-01-02 | End: 2024-01-05

## 2024-01-02 RX ORDER — PREDNISONE 20 MG/1
40 TABLET ORAL DAILY
Qty: 10 TABLET | Refills: 0 | Status: SHIPPED | OUTPATIENT
Start: 2024-01-02 | End: 2024-01-07

## 2024-01-02 RX ORDER — ALPRAZOLAM 0.25 MG/1
0.25 TABLET ORAL NIGHTLY PRN
Qty: 30 TABLET | Refills: 0 | Status: CANCELLED | OUTPATIENT
Start: 2024-01-02 | End: 2024-02-01

## 2024-01-02 SDOH — ECONOMIC STABILITY: INCOME INSECURITY: HOW HARD IS IT FOR YOU TO PAY FOR THE VERY BASICS LIKE FOOD, HOUSING, MEDICAL CARE, AND HEATING?: PATIENT DECLINED

## 2024-01-02 SDOH — ECONOMIC STABILITY: HOUSING INSECURITY
IN THE LAST 12 MONTHS, WAS THERE A TIME WHEN YOU DID NOT HAVE A STEADY PLACE TO SLEEP OR SLEPT IN A SHELTER (INCLUDING NOW)?: PATIENT DECLINED

## 2024-01-02 SDOH — ECONOMIC STABILITY: FOOD INSECURITY: WITHIN THE PAST 12 MONTHS, YOU WORRIED THAT YOUR FOOD WOULD RUN OUT BEFORE YOU GOT MONEY TO BUY MORE.: PATIENT DECLINED

## 2024-01-02 SDOH — ECONOMIC STABILITY: FOOD INSECURITY: WITHIN THE PAST 12 MONTHS, THE FOOD YOU BOUGHT JUST DIDN'T LAST AND YOU DIDN'T HAVE MONEY TO GET MORE.: PATIENT DECLINED

## 2024-01-02 ASSESSMENT — PATIENT HEALTH QUESTIONNAIRE - PHQ9
SUM OF ALL RESPONSES TO PHQ9 QUESTIONS 1 & 2: 0
SUM OF ALL RESPONSES TO PHQ QUESTIONS 1-9: 0
SUM OF ALL RESPONSES TO PHQ QUESTIONS 1-9: 0
1. LITTLE INTEREST OR PLEASURE IN DOING THINGS: 0
2. FEELING DOWN, DEPRESSED OR HOPELESS: 0
SUM OF ALL RESPONSES TO PHQ QUESTIONS 1-9: 0
SUM OF ALL RESPONSES TO PHQ QUESTIONS 1-9: 0

## 2024-01-02 ASSESSMENT — ENCOUNTER SYMPTOMS
SWOLLEN GLANDS: 0
HEMOPTYSIS: 0
TROUBLE SWALLOWING: 0
SHORTNESS OF BREATH: 0
COUGH: 1
SINUS PRESSURE: 1
DIARRHEA: 0
SINUS PAIN: 1
WHEEZING: 1
SORE THROAT: 1
RHINORRHEA: 1
VOMITING: 0
NAUSEA: 0
VOICE CHANGE: 0
ABDOMINAL PAIN: 0
HEARTBURN: 0

## 2024-01-02 NOTE — PROGRESS NOTES
non-recurrent pansinusitis  amoxicillin-clavulanate (AUGMENTIN) 875-125 MG per tablet    predniSONE (DELTASONE) 20 MG tablet      2. Acute bronchitis, unspecified organism  POC COVID-19, FLU A/B    predniSONE (DELTASONE) 20 MG tablet    guaiFENesin-codeine (TUSSI-ORGANIDIN NR) 100-10 MG/5ML syrup          1.  Lucy received counseling on the following healthy behaviors: nutrition and medication adherence  2.  Patient given educational materials - see patient instructions  3.  Was a self-tracking handout given in paper form or via [employer]? No  If yes, see orders or list here.  4.  Discussed use, benefit, and side effects of prescribed medications.  Barriers to medication compliance addressed.  All patient questions answered.Pt voiced understanding.   5.  Reviewed prior labs and health maintenance  6.  Continue current medications, diet and exercise.    Completed Refills   Requested Prescriptions     Signed Prescriptions Disp Refills    amoxicillin-clavulanate (AUGMENTIN) 875-125 MG per tablet 20 tablet 0     Sig: Take 1 tablet by mouth 2 times daily for 10 days    predniSONE (DELTASONE) 20 MG tablet 10 tablet 0     Sig: Take 2 tablets by mouth daily for 5 days    guaiFENesin-codeine (TUSSI-ORGANIDIN NR) 100-10 MG/5ML syrup 120 mL 0     Sig: Take 10 mLs by mouth 4 times daily as needed for Cough for up to 3 days. Max Daily Amount: 40 mLs         Return if symptoms worsen or fail to improve.

## 2024-01-02 NOTE — PATIENT INSTRUCTIONS
SURVEY:     You may be receiving a survey from [facility] regarding your visit today.     Please complete the survey to enable us to provide the highest quality of care to you and your family.     If you cannot score us a very good on any question, please call the office to discuss how we could have made your experience a very good one.     Thank you,    Barry Nassar, APRN-CNP  Macy Rashid, APRN-CNP  Caroline, VICKY Lacey, MIO Shay, CMA  Rachael, CMA  Conchita, PCA  Randi, PM

## 2024-03-25 ENCOUNTER — HOSPITAL ENCOUNTER (OUTPATIENT)
Dept: WOMENS IMAGING | Age: 69
Discharge: HOME | End: 2024-03-27
Payer: MEDICARE

## 2024-03-25 DIAGNOSIS — Z12.31 SCREENING MAMMOGRAM FOR BREAST CANCER: ICD-10-CM

## 2024-03-25 PROCEDURE — 77063 BREAST TOMOSYNTHESIS BI: CPT

## 2024-08-27 ENCOUNTER — OFFICE VISIT (OUTPATIENT)
Dept: PRIMARY CARE CLINIC | Age: 69
End: 2024-08-27
Payer: MEDICARE

## 2024-08-27 VITALS
WEIGHT: 216.8 LBS | OXYGEN SATURATION: 97 % | DIASTOLIC BLOOD PRESSURE: 68 MMHG | SYSTOLIC BLOOD PRESSURE: 130 MMHG | BODY MASS INDEX: 34.99 KG/M2 | HEART RATE: 80 BPM | TEMPERATURE: 97.2 F

## 2024-08-27 DIAGNOSIS — J01.40 ACUTE NON-RECURRENT PANSINUSITIS: Primary | ICD-10-CM

## 2024-08-27 DIAGNOSIS — R05.9 COUGH, UNSPECIFIED TYPE: ICD-10-CM

## 2024-08-27 LAB
INFLUENZA A ANTIGEN, POC: NEGATIVE
INFLUENZA B ANTIGEN, POC: NEGATIVE
LOT NUMBER POC: NORMAL
SARS-COV-2 RNA POC - COV: NORMAL
VALID INTERNAL CONTROL, POC: PRESENT
VENDOR AND KIT NAME POC: NORMAL

## 2024-08-27 PROCEDURE — 1123F ACP DISCUSS/DSCN MKR DOCD: CPT | Performed by: NURSE PRACTITIONER

## 2024-08-27 PROCEDURE — 99214 OFFICE O/P EST MOD 30 MIN: CPT | Performed by: NURSE PRACTITIONER

## 2024-08-27 ASSESSMENT — ENCOUNTER SYMPTOMS
SINUS PAIN: 1
EYES NEGATIVE: 1
GASTROINTESTINAL NEGATIVE: 1
COUGH: 1
RHINORRHEA: 1
SINUS PRESSURE: 1

## 2024-08-27 NOTE — PROGRESS NOTES
[facility]  ALVARO WARREN CNP  [facility] PRIMARY CARE  [address] [address]  Dept: [phone]  Dept Fax: [phone]      Name: Lucy Valera  : 1955         Chief Complaint:     Chief Complaint   Patient presents with    Cough     Patient is here for sinus pressure in face and head. Dry cough, no fever. X 3 days. Body aches and chills on day 1 Saturday.        History of Present Illness:      Lucy Valera is a 68 y.o.  female who presents with Cough (Patient is here for sinus pressure in face and head. Dry cough, no fever. X 3 days. Body aches and chills on day 1 Saturday. )      BERTHA Ayala is here for a sick visit.  She started feeling sick 3 days ago and states she feels the same.  She has a headache with sinus pain and pressure.  She feels pain in her jaw and bilateral ears.  She has a dry cough.  Denies wheezing or shortness of breath.  No fever.  She has had body aches and chills.  She has used Tylenol and Motrin at home.  She has also taken some Zyrtec at home with no improvement.    Past Medical History:     Past Medical History:   Diagnosis Date    H/O cardiac catheterization 2019    Normal coronary arteries Normal global Lt ventricular systolic function     H/O cardiovascular stress test 2019    Significant electrocardiographic evidence of MI during EKG monitoring w/o significant asso arrhythmias Treadmill [facility] sore -7 intermediate/high risk significant CAD    History of echocardiogram 2019    EF of >55%. No clear evidence of diastolic dysfunction is seen.    Hyperlipidemia       Reviewed all health maintenance requirements and ordered appropriate tests  Health Maintenance Due   Topic Date Due    DEXA (modify frequency per FRAX score)  Never done    Annual Wellness Visit (Medicare Advantage)  2024    Flu vaccine (1) Never done       Past Surgical History:     Past Surgical History:   Procedure Laterality Date    CARPAL TUNNEL  (Site: Left Upper Arm, Position: Sitting, Cuff Size: Large Adult)   Pulse 80   Temp 97.2 °F (36.2 °C)   Wt 98.3 kg (216 lb 12.8 oz)   SpO2 97%   BMI 34.99 kg/m²     Physical Exam  Vitals and nursing note reviewed.   Constitutional:       General: She is not in acute distress.     Appearance: Normal appearance. She is obese. She is not ill-appearing.   HENT:      Head: Normocephalic.      Right Ear: Tympanic membrane and external ear normal.      Left Ear: Tympanic membrane and external ear normal.      Nose: Mucosal edema, congestion and rhinorrhea present. Rhinorrhea is clear.      Right Sinus: Maxillary sinus tenderness and frontal sinus tenderness present.      Left Sinus: Maxillary sinus tenderness and frontal sinus tenderness present.      Mouth/Throat:      Lips: Pink.      Mouth: Mucous membranes are moist.      Pharynx: Oropharyngeal exudate and postnasal drip present.   Eyes:      Extraocular Movements: Extraocular movements intact.      Conjunctiva/sclera: Conjunctivae normal.      Pupils: Pupils are equal, round, and reactive to light.   Cardiovascular:      Rate and Rhythm: Normal rate and regular rhythm.      Heart sounds: Normal heart sounds. No murmur heard.  Pulmonary:      Effort: Pulmonary effort is normal. No respiratory distress.      Breath sounds: Normal breath sounds. No wheezing.   Abdominal:      General: Bowel sounds are normal.      Palpations: Abdomen is soft.   Musculoskeletal:         General: Normal range of motion.      Cervical back: Normal range of motion and neck supple.   Skin:     General: Skin is warm.      Capillary Refill: Capillary refill takes less than 2 seconds.   Neurological:      General: No focal deficit present.      Mental Status: She is alert and oriented to person, place, and time.   Psychiatric:         Mood and Affect: Mood normal.         Behavior: Behavior normal.         Thought Content: Thought content normal.         Judgment: Judgment normal.

## 2024-08-27 NOTE — PATIENT INSTRUCTIONS
SURVEY:     You may be receiving a survey from [facility] regarding your visit today.     Please complete the survey to enable us to provide the highest quality of care to you and your family.     If you cannot score us a very good on any question, please call the office to discuss how we could have made your experience a very good one.     Thank you,    Barry Nassar, APRN-CNP  Macy Rashid, APRN-CNP  Caroline, LPN  Deepthi, CMA  Jaspal, CMA  Rachael, CMA  Conchita, PCA  Donna, CMA  Randi, PM

## 2024-09-25 ENCOUNTER — OFFICE VISIT (OUTPATIENT)
Dept: PRIMARY CARE CLINIC | Age: 69
End: 2024-09-25
Payer: MEDICARE

## 2024-09-25 VITALS
DIASTOLIC BLOOD PRESSURE: 74 MMHG | HEIGHT: 63 IN | TEMPERATURE: 98.5 F | OXYGEN SATURATION: 99 % | RESPIRATION RATE: 20 BRPM | BODY MASS INDEX: 38.09 KG/M2 | HEART RATE: 68 BPM | SYSTOLIC BLOOD PRESSURE: 128 MMHG | WEIGHT: 215 LBS

## 2024-09-25 DIAGNOSIS — Z23 NEED FOR SHINGLES VACCINE: ICD-10-CM

## 2024-09-25 DIAGNOSIS — Z78.0 POST-MENOPAUSAL: ICD-10-CM

## 2024-09-25 DIAGNOSIS — Z13.6 SCREENING FOR HEART DISEASE: ICD-10-CM

## 2024-09-25 DIAGNOSIS — Z00.00 MEDICARE ANNUAL WELLNESS VISIT, SUBSEQUENT: Primary | ICD-10-CM

## 2024-09-25 DIAGNOSIS — F41.9 ANXIETY: ICD-10-CM

## 2024-09-25 PROCEDURE — 1123F ACP DISCUSS/DSCN MKR DOCD: CPT | Performed by: NURSE PRACTITIONER

## 2024-09-25 PROCEDURE — G0439 PPPS, SUBSEQ VISIT: HCPCS | Performed by: NURSE PRACTITIONER

## 2024-09-25 PROCEDURE — 90653 IIV ADJUVANT VACCINE IM: CPT | Performed by: NURSE PRACTITIONER

## 2024-09-25 PROCEDURE — G0008 ADMIN INFLUENZA VIRUS VAC: HCPCS | Performed by: NURSE PRACTITIONER

## 2024-09-25 RX ORDER — ZOSTER VACCINE RECOMBINANT, ADJUVANTED 50 MCG/0.5
0.5 KIT INTRAMUSCULAR SEE ADMIN INSTRUCTIONS
Qty: 0.5 ML | Refills: 0 | Status: SHIPPED | OUTPATIENT
Start: 2024-09-25 | End: 2025-03-24

## 2024-09-25 RX ORDER — ALPRAZOLAM 0.25 MG
0.25 TABLET ORAL NIGHTLY PRN
Qty: 30 TABLET | Refills: 1 | Status: SHIPPED | OUTPATIENT
Start: 2024-09-25 | End: 2024-09-25 | Stop reason: SDUPTHER

## 2024-09-25 RX ORDER — ALPRAZOLAM 0.25 MG
0.25 TABLET ORAL NIGHTLY PRN
Qty: 30 TABLET | Refills: 1 | Status: SHIPPED | OUTPATIENT
Start: 2024-09-25 | End: 2024-11-24

## 2024-09-25 ASSESSMENT — LIFESTYLE VARIABLES
HOW OFTEN DURING THE LAST YEAR HAVE YOU FOUND THAT YOU WERE NOT ABLE TO STOP DRINKING ONCE YOU HAD STARTED: NEVER
HOW OFTEN DO YOU HAVE A DRINK CONTAINING ALCOHOL: 2-3 TIMES A WEEK
HOW MANY STANDARD DRINKS CONTAINING ALCOHOL DO YOU HAVE ON A TYPICAL DAY: 3 OR 4
HOW OFTEN DURING THE LAST YEAR HAVE YOU HAD A FEELING OF GUILT OR REMORSE AFTER DRINKING: NEVER
HAS A RELATIVE, FRIEND, DOCTOR, OR ANOTHER HEALTH PROFESSIONAL EXPRESSED CONCERN ABOUT YOUR DRINKING OR SUGGESTED YOU CUT DOWN: NO
HAVE YOU OR SOMEONE ELSE BEEN INJURED AS A RESULT OF YOUR DRINKING: NO
HOW OFTEN DURING THE LAST YEAR HAVE YOU NEEDED AN ALCOHOLIC DRINK FIRST THING IN THE MORNING TO GET YOURSELF GOING AFTER A NIGHT OF HEAVY DRINKING: NEVER
HOW OFTEN DURING THE LAST YEAR HAVE YOU FAILED TO DO WHAT WAS NORMALLY EXPECTED FROM YOU BECAUSE OF DRINKING: NEVER
HOW OFTEN DURING THE LAST YEAR HAVE YOU BEEN UNABLE TO REMEMBER WHAT HAPPENED THE NIGHT BEFORE BECAUSE YOU HAD BEEN DRINKING: NEVER

## 2024-09-25 ASSESSMENT — PATIENT HEALTH QUESTIONNAIRE - PHQ9
SUM OF ALL RESPONSES TO PHQ QUESTIONS 1-9: 0
1. LITTLE INTEREST OR PLEASURE IN DOING THINGS: NOT AT ALL
SUM OF ALL RESPONSES TO PHQ9 QUESTIONS 1 & 2: 0
SUM OF ALL RESPONSES TO PHQ QUESTIONS 1-9: 0
2. FEELING DOWN, DEPRESSED OR HOPELESS: NOT AT ALL

## 2024-09-25 ASSESSMENT — ENCOUNTER SYMPTOMS
HYPERVENTILATION: 0
SHORTNESS OF BREATH: 0
FEELING OF CHOKING: 0
NAUSEA: 0

## 2024-10-01 ENCOUNTER — TELEPHONE (OUTPATIENT)
Dept: PRIMARY CARE CLINIC | Age: 69
End: 2024-10-01

## 2024-10-01 LAB
ALT SERPL-CCNC: 18 U/L (ref 5–33)
AST SERPL-CCNC: 18 U/L (ref 9–40)
BUN BLDV-MCNC: 17 MG/DL (ref 8–23)
CALCIUM SERPL-MCNC: 9.1 MG/DL (ref 8.6–10.5)
CHLORIDE BLD-SCNC: 106 MMOL/L (ref 96–107)
CHOLESTEROL, TOTAL: 230 MG/DL (ref 100–199)
CHOLESTEROL/HDL RATIO: 4.3 (ref 2–4.5)
CO2: 24 MMOL/L (ref 18–32)
CREAT SERPL-MCNC: 0.75 MG/DL (ref 0.51–1.15)
EGFR IF NONAFRICAN AMERICAN: 87 ML/MIN/1.73M2
GLUCOSE: 87 MG/DL (ref 70–100)
HDLC SERPL-MCNC: 54 MG/DL
LDL CHOLESTEROL: 138 MG/DL
LDL/HDL RATIO: 2.6
POTASSIUM SERPL-SCNC: 4.3 MMOL/L (ref 3.5–5.4)
SODIUM BLD-SCNC: 142 MMOL/L (ref 135–148)
TRIGL SERPL-MCNC: 190 MG/DL (ref 20–149)
VLDLC SERPL CALC-MCNC: 38 MG/DL

## 2024-10-01 NOTE — TELEPHONE ENCOUNTER
----- Message from HESHAM Pedersen CNP sent at 10/1/2024 10:10 AM EDT -----  Cholesterol is a little worse.  Otherwise labs are stable.  Would avoid fatty sugary foods as much as possible.

## 2025-01-16 ENCOUNTER — PATIENT MESSAGE (OUTPATIENT)
Dept: PRIMARY CARE CLINIC | Age: 70
End: 2025-01-16

## 2025-01-16 ENCOUNTER — OFFICE VISIT (OUTPATIENT)
Dept: PRIMARY CARE CLINIC | Age: 70
End: 2025-01-16
Payer: MEDICARE

## 2025-01-16 VITALS
SYSTOLIC BLOOD PRESSURE: 114 MMHG | TEMPERATURE: 97.6 F | WEIGHT: 216.2 LBS | RESPIRATION RATE: 16 BRPM | DIASTOLIC BLOOD PRESSURE: 82 MMHG | BODY MASS INDEX: 38.31 KG/M2 | HEART RATE: 81 BPM | OXYGEN SATURATION: 99 %

## 2025-01-16 DIAGNOSIS — R05.9 COUGH, UNSPECIFIED TYPE: ICD-10-CM

## 2025-01-16 DIAGNOSIS — H92.01 OTALGIA OF RIGHT EAR: ICD-10-CM

## 2025-01-16 DIAGNOSIS — H60.501 ACUTE OTITIS EXTERNA OF RIGHT EAR, UNSPECIFIED TYPE: ICD-10-CM

## 2025-01-16 DIAGNOSIS — U07.1 COVID: Primary | ICD-10-CM

## 2025-01-16 LAB
INFLUENZA A ANTIGEN, POC: NEGATIVE
INFLUENZA B ANTIGEN, POC: NEGATIVE
LOT NUMBER POC: ABNORMAL
SARS-COV-2 RNA POC - COV: DETECTED
VALID INTERNAL CONTROL, POC: PRESENT
VENDOR AND KIT NAME POC: ABNORMAL

## 2025-01-16 PROCEDURE — 99214 OFFICE O/P EST MOD 30 MIN: CPT | Performed by: NURSE PRACTITIONER

## 2025-01-16 PROCEDURE — 1159F MED LIST DOCD IN RCRD: CPT | Performed by: NURSE PRACTITIONER

## 2025-01-16 PROCEDURE — 1123F ACP DISCUSS/DSCN MKR DOCD: CPT | Performed by: NURSE PRACTITIONER

## 2025-01-16 PROCEDURE — 1160F RVW MEDS BY RX/DR IN RCRD: CPT | Performed by: NURSE PRACTITIONER

## 2025-01-16 RX ORDER — OFLOXACIN 3 MG/ML
10 SOLUTION AURICULAR (OTIC) 2 TIMES DAILY
Qty: 10 ML | Refills: 0 | Status: SHIPPED | OUTPATIENT
Start: 2025-01-16 | End: 2025-01-26

## 2025-01-16 RX ORDER — FLUTICASONE PROPIONATE 50 MCG
1 SPRAY, SUSPENSION (ML) NASAL DAILY
Qty: 32 G | Refills: 1 | Status: SHIPPED | OUTPATIENT
Start: 2025-01-16

## 2025-01-16 SDOH — ECONOMIC STABILITY: FOOD INSECURITY: WITHIN THE PAST 12 MONTHS, YOU WORRIED THAT YOUR FOOD WOULD RUN OUT BEFORE YOU GOT MONEY TO BUY MORE.: NEVER TRUE

## 2025-01-16 SDOH — ECONOMIC STABILITY: FOOD INSECURITY: WITHIN THE PAST 12 MONTHS, THE FOOD YOU BOUGHT JUST DIDN'T LAST AND YOU DIDN'T HAVE MONEY TO GET MORE.: NEVER TRUE

## 2025-01-16 ASSESSMENT — ENCOUNTER SYMPTOMS
SHORTNESS OF BREATH: 0
COUGH: 1
ALLERGIC/IMMUNOLOGIC NEGATIVE: 1
RHINORRHEA: 1
EYES NEGATIVE: 1
WHEEZING: 0
SORE THROAT: 1
GASTROINTESTINAL NEGATIVE: 1

## 2025-01-16 ASSESSMENT — PATIENT HEALTH QUESTIONNAIRE - PHQ9
SUM OF ALL RESPONSES TO PHQ QUESTIONS 1-9: 0
SUM OF ALL RESPONSES TO PHQ QUESTIONS 1-9: 0
1. LITTLE INTEREST OR PLEASURE IN DOING THINGS: NOT AT ALL
2. FEELING DOWN, DEPRESSED OR HOPELESS: NOT AT ALL
SUM OF ALL RESPONSES TO PHQ QUESTIONS 1-9: 0
SUM OF ALL RESPONSES TO PHQ QUESTIONS 1-9: 0
SUM OF ALL RESPONSES TO PHQ9 QUESTIONS 1 & 2: 0

## 2025-01-16 NOTE — PROGRESS NOTES
[facility] PHYSICIANS  ALVARO WARREN CNP  [facility] PRIMARY CARE  [address] [address]  Dept: [phone]  Dept Fax: [phone]      Name: Lucy Valera  : 1955         Chief Complaint:     Chief Complaint   Patient presents with    Congestion     X 2 days.     Cough     X 2 days.     Ear Pain     X 2 days. Patient c/o right ear pain.        History of Present Illness:      Lucy Valera is a 69 y.o.  female who presents with Congestion (X 2 days. ), Cough (X 2 days. ), and Ear Pain (X 2 days. Patient c/o right ear pain. )      BERTHA Ayala is here today for a sick visit.  She started with fatigue a week ago.  Two days ago she started with rhinorrhea and congestion.  She has right ear pain and pain on the right side of her jaw.  She has had bloody drainage come out of right ear.  She has a slight dry cough.  Denies shortness of breath or wheezing.  She has a scratchy throat.  Denies body aches.  She has a headache.  She has had a low grade fever on and off.  She has taken Zyrtec and Tylenol for her symptoms.      Past Medical History:     Past Medical History:   Diagnosis Date    H/O cardiac catheterization 2019    Normal coronary arteries Normal global Lt ventricular systolic function     H/O cardiovascular stress test 2019    Significant electrocardiographic evidence of MI during EKG monitoring w/o significant asso arrhythmias Treadmill [facility] sore -7 intermediate/high risk significant CAD    History of echocardiogram 2019    EF of >55%. No clear evidence of diastolic dysfunction is seen.    Hyperlipidemia       Reviewed all health maintenance requirements and ordered appropriate tests  Health Maintenance Due   Topic Date Due    DEXA (modify frequency per FRAX score)  Never done    Annual Wellness Visit (Medicare Advantage)  2025       Past Surgical History:     Past Surgical History:   Procedure Laterality Date    CARPAL TUNNEL RELEASE      TUBAL

## 2025-01-16 NOTE — PATIENT INSTRUCTIONS
SURVEY:     You may be receiving a survey from [facility] [employer] regarding your visit today.     Please complete the survey to enable us to provide the highest quality of care to you and your family.     If you cannot score us a very good on any question, please call the office to discuss how we could have made your experience a very good one.     Thank you,    Barry Nassar, APRN-CNP  Macy Rashid, APRN-CNP  Caroline, LPN  Deepthi, CMA  Jaspal, CMA  Rachael, CMA  Conchita, PCA  Donna, CMA  Randi, PM

## 2025-03-24 ENCOUNTER — OFFICE VISIT (OUTPATIENT)
Dept: PRIMARY CARE CLINIC | Age: 70
End: 2025-03-24
Payer: MEDICARE

## 2025-03-24 ENCOUNTER — HOSPITAL ENCOUNTER (OUTPATIENT)
Age: 70
Discharge: HOME | End: 2025-03-24
Payer: MEDICARE

## 2025-03-24 ENCOUNTER — HOSPITAL ENCOUNTER (OUTPATIENT)
Dept: CT IMAGING | Age: 70
Discharge: HOME | End: 2025-03-26
Payer: MEDICARE

## 2025-03-24 ENCOUNTER — RESULTS FOLLOW-UP (OUTPATIENT)
Dept: PRIMARY CARE CLINIC | Age: 70
End: 2025-03-24

## 2025-03-24 VITALS
SYSTOLIC BLOOD PRESSURE: 116 MMHG | TEMPERATURE: 97.9 F | HEART RATE: 88 BPM | WEIGHT: 210.2 LBS | RESPIRATION RATE: 18 BRPM | BODY MASS INDEX: 37.24 KG/M2 | OXYGEN SATURATION: 99 % | DIASTOLIC BLOOD PRESSURE: 70 MMHG

## 2025-03-24 DIAGNOSIS — R10.12 LEFT UPPER QUADRANT ABDOMINAL PAIN: Primary | ICD-10-CM

## 2025-03-24 DIAGNOSIS — R10.12 LEFT UPPER QUADRANT ABDOMINAL PAIN: ICD-10-CM

## 2025-03-24 DIAGNOSIS — R07.9 CHEST PAIN, UNSPECIFIED TYPE: ICD-10-CM

## 2025-03-24 DIAGNOSIS — K21.00 GASTROESOPHAGEAL REFLUX DISEASE WITH ESOPHAGITIS, UNSPECIFIED WHETHER HEMORRHAGE: ICD-10-CM

## 2025-03-24 DIAGNOSIS — R06.02 SHORTNESS OF BREATH: ICD-10-CM

## 2025-03-24 DIAGNOSIS — R11.11 VOMITING WITHOUT NAUSEA, UNSPECIFIED VOMITING TYPE: ICD-10-CM

## 2025-03-24 LAB
ALBUMIN SERPL-MCNC: 4.7 G/DL (ref 3.5–5.2)
ALBUMIN/GLOB SERPL: 1.6 {RATIO} (ref 1–2.5)
ALP SERPL-CCNC: 57 U/L (ref 35–104)
ALT SERPL-CCNC: 19 U/L (ref 10–35)
ANION GAP SERPL CALCULATED.3IONS-SCNC: 12 MMOL/L (ref 9–16)
AST SERPL-CCNC: 21 U/L (ref 10–35)
BASOPHILS # BLD: 0.03 K/UL (ref 0–0.2)
BASOPHILS NFR BLD: 0 % (ref 0–2)
BILIRUB SERPL-MCNC: 0.6 MG/DL (ref 0–1.2)
BNP SERPL-MCNC: <36 PG/ML (ref 0–125)
BUN SERPL-MCNC: 11 MG/DL (ref 8–23)
BUN/CREAT SERPL: 14 (ref 9–20)
CALCIUM SERPL-MCNC: 9.4 MG/DL (ref 8.6–10.4)
CHLORIDE SERPL-SCNC: 104 MMOL/L (ref 98–107)
CO2 SERPL-SCNC: 25 MMOL/L (ref 20–31)
CREAT SERPL-MCNC: 0.8 MG/DL (ref 0.5–0.9)
CRP SERPL HS-MCNC: 3.5 MG/L (ref 0–5)
D DIMER PPP FEU-MCNC: <0.27 UG/ML FEU (ref 0–0.59)
EOSINOPHIL # BLD: 0.03 K/UL (ref 0–0.44)
EOSINOPHILS RELATIVE PERCENT: 0 % (ref 1–4)
ERYTHROCYTE [DISTWIDTH] IN BLOOD BY AUTOMATED COUNT: 12.6 % (ref 11.8–14.4)
ERYTHROCYTE [SEDIMENTATION RATE] IN BLOOD BY PHOTOMETRIC METHOD: 2 MM/HR (ref 0–30)
GFR, ESTIMATED: 82 ML/MIN/1.73M2
GLUCOSE SERPL-MCNC: 112 MG/DL (ref 74–99)
HCT VFR BLD AUTO: 42.4 % (ref 36.3–47.1)
HGB BLD-MCNC: 14.4 G/DL (ref 11.9–15.1)
IMM GRANULOCYTES # BLD AUTO: <0.03 K/UL (ref 0–0.3)
IMM GRANULOCYTES NFR BLD: 0 %
LYMPHOCYTES NFR BLD: 1.66 K/UL (ref 1.1–3.7)
LYMPHOCYTES RELATIVE PERCENT: 23 % (ref 24–43)
MCH RBC QN AUTO: 30.5 PG (ref 25.2–33.5)
MCHC RBC AUTO-ENTMCNC: 34 G/DL (ref 28.4–34.8)
MCV RBC AUTO: 89.8 FL (ref 82.6–102.9)
MONOCYTES NFR BLD: 0.49 K/UL (ref 0.1–1.2)
MONOCYTES NFR BLD: 7 % (ref 3–12)
NEUTROPHILS NFR BLD: 70 % (ref 36–65)
NEUTS SEG NFR BLD: 5.14 K/UL (ref 1.5–8.1)
NRBC BLD-RTO: 0 PER 100 WBC
PLATELET # BLD AUTO: 264 K/UL (ref 138–453)
PMV BLD AUTO: 10.4 FL (ref 8.1–13.5)
POTASSIUM SERPL-SCNC: 4 MMOL/L (ref 3.7–5.3)
PROT SERPL-MCNC: 7.5 G/DL (ref 6.6–8.7)
RBC # BLD AUTO: 4.72 M/UL (ref 3.95–5.11)
SODIUM SERPL-SCNC: 141 MMOL/L (ref 136–145)
TROPONIN I SERPL HS-MCNC: 10 NG/L (ref 0–14)
WBC OTHER # BLD: 7.4 K/UL (ref 3.5–11.3)

## 2025-03-24 PROCEDURE — 1123F ACP DISCUSS/DSCN MKR DOCD: CPT | Performed by: NURSE PRACTITIONER

## 2025-03-24 PROCEDURE — 84484 ASSAY OF TROPONIN QUANT: CPT

## 2025-03-24 PROCEDURE — 83880 ASSAY OF NATRIURETIC PEPTIDE: CPT

## 2025-03-24 PROCEDURE — 6360000004 HC RX CONTRAST MEDICATION: Performed by: NURSE PRACTITIONER

## 2025-03-24 PROCEDURE — 74177 CT ABD & PELVIS W/CONTRAST: CPT

## 2025-03-24 PROCEDURE — 85379 FIBRIN DEGRADATION QUANT: CPT

## 2025-03-24 PROCEDURE — 36415 COLL VENOUS BLD VENIPUNCTURE: CPT

## 2025-03-24 PROCEDURE — 93005 ELECTROCARDIOGRAM TRACING: CPT

## 2025-03-24 PROCEDURE — 99214 OFFICE O/P EST MOD 30 MIN: CPT | Performed by: NURSE PRACTITIONER

## 2025-03-24 PROCEDURE — 1159F MED LIST DOCD IN RCRD: CPT | Performed by: NURSE PRACTITIONER

## 2025-03-24 PROCEDURE — 86140 C-REACTIVE PROTEIN: CPT

## 2025-03-24 PROCEDURE — 85652 RBC SED RATE AUTOMATED: CPT

## 2025-03-24 PROCEDURE — 80053 COMPREHEN METABOLIC PANEL: CPT

## 2025-03-24 PROCEDURE — 1160F RVW MEDS BY RX/DR IN RCRD: CPT | Performed by: NURSE PRACTITIONER

## 2025-03-24 PROCEDURE — 85025 COMPLETE CBC W/AUTO DIFF WBC: CPT

## 2025-03-24 RX ORDER — IOPAMIDOL 755 MG/ML
9 INJECTION, SOLUTION INTRAVASCULAR
Status: DISCONTINUED | OUTPATIENT
Start: 2025-03-24 | End: 2025-03-24

## 2025-03-24 RX ORDER — IOPAMIDOL 755 MG/ML
9 INJECTION, SOLUTION INTRAVASCULAR
Status: COMPLETED | OUTPATIENT
Start: 2025-03-24 | End: 2025-03-24

## 2025-03-24 RX ORDER — IOPAMIDOL 755 MG/ML
75 INJECTION, SOLUTION INTRAVASCULAR
Status: COMPLETED | OUTPATIENT
Start: 2025-03-24 | End: 2025-03-24

## 2025-03-24 RX ADMIN — IOPAMIDOL 9 ML: 755 INJECTION, SOLUTION INTRAVENOUS at 13:15

## 2025-03-24 RX ADMIN — IOPAMIDOL 75 ML: 755 INJECTION, SOLUTION INTRAVENOUS at 14:16

## 2025-03-24 ASSESSMENT — ENCOUNTER SYMPTOMS
ABDOMINAL PAIN: 1
NAUSEA: 1
VOMITING: 1
RESPIRATORY NEGATIVE: 1
EYES NEGATIVE: 1
ALLERGIC/IMMUNOLOGIC NEGATIVE: 1

## 2025-03-24 NOTE — PROGRESS NOTES
[facility] PHYSICIANS  ALVARO WARREN CNP  [facility] PRIMARY CARE  [address] [address]  Dept: [phone]  Dept Fax: [phone]      Name: Lucy Valera  : 1955         Chief Complaint:     Chief Complaint   Patient presents with    Heartburn     Has had it before but has gotten really bad since Saturday. Feels like pressure on her chest and having difficulty keeping water down. Unable to keep food down as well.        History of Present Illness:      Lucy Valera is a 69 y.o.  female who presents with Heartburn (Has had it before but has gotten really bad since Saturday. Feels like pressure on her chest and having difficulty keeping water down. Unable to keep food down as well. )      BERTHA Roldan is here today for complaints of GERD symptoms.  Symptoms started Saturday with increased gas and GERD symptoms.  She is now having left sided upper abdominal pain under her rib and unable to keep anything down.  This started yesterday.  She can not keep any fluids down today.  She vomits water if she tries to drink it.  Pain is under left rib and breast bone.  She is feeling gastric reflux with that.  Denies vomiting or diarrhea.  She is having some left sided chest pain but unable to tell if it is related to the GERD or separate.  The chest pain has been on and off since Saturday.  She is unable to lay down without getting dizzy and nauseated.   She has had some shortness of breath.  Her symptoms got worse yesterday as the day went on yesterday.  She was taking Pepcid with no improvement.      Past Medical History:     Past Medical History:   Diagnosis Date    H/O cardiac catheterization 2019    Normal coronary arteries Normal global Lt ventricular systolic function     H/O cardiovascular stress test 2019    Significant electrocardiographic evidence of MI during EKG monitoring w/o significant asso arrhythmias Treadmill [facility] sore -7 intermediate/high risk significant CAD

## 2025-03-24 NOTE — PATIENT INSTRUCTIONS
SURVEY:     You may be receiving a survey from [facility] [employer] regarding your visit today.     Please complete the survey to enable us to provide the highest quality of care to you and your family.     If you cannot score us a very good on any question, please call the office to discuss how we could have made your experience a very good one.     Thank you,    Barry Nassar, APRN-CNP  Macy Rashid, APRN-CNP  Caroline, LPN  Deepthi, CMA  Jaspal, CMA  Rachael, CMA  Conchita, PCA  Donna, CMA  Randi, PM

## 2025-03-24 NOTE — TELEPHONE ENCOUNTER
----- Message from HESHAM Naik CNP sent at 3/24/2025  3:27 PM EDT -----  Please notify patient of stable lab results.  Thanks Macy

## 2025-03-25 ENCOUNTER — RESULTS FOLLOW-UP (OUTPATIENT)
Dept: PRIMARY CARE CLINIC | Age: 70
End: 2025-03-25

## 2025-03-25 DIAGNOSIS — K21.9 GASTROESOPHAGEAL REFLUX DISEASE WITHOUT ESOPHAGITIS: Primary | ICD-10-CM

## 2025-03-25 LAB
EKG ATRIAL RATE: 78 BPM
EKG P AXIS: 7 DEGREES
EKG P-R INTERVAL: 168 MS
EKG Q-T INTERVAL: 408 MS
EKG QRS DURATION: 88 MS
EKG QTC CALCULATION (BAZETT): 465 MS
EKG R AXIS: -5 DEGREES
EKG T AXIS: 29 DEGREES
EKG VENTRICULAR RATE: 78 BPM

## 2025-03-25 RX ORDER — OMEPRAZOLE 20 MG/1
20 CAPSULE, DELAYED RELEASE ORAL
Qty: 30 CAPSULE | Refills: 0 | Status: SHIPPED | OUTPATIENT
Start: 2025-03-25

## 2025-03-25 RX ORDER — SUCRALFATE ORAL 1 G/10ML
1 SUSPENSION ORAL 4 TIMES DAILY PRN
Qty: 400 ML | Refills: 0 | Status: SHIPPED | OUTPATIENT
Start: 2025-03-25 | End: 2025-04-04

## 2025-03-25 NOTE — TELEPHONE ENCOUNTER
----- Message from HESHAM Naik CNP sent at 3/25/2025  8:06 AM EDT -----  Please notify patient of normal CT results.  I am going to send some medication for her to start.  If she does not have any improvement call office for follow up.  Thanks Macy

## 2025-03-26 ENCOUNTER — RESULTS FOLLOW-UP (OUTPATIENT)
Dept: PRIMARY CARE CLINIC | Age: 70
End: 2025-03-26

## 2025-03-26 NOTE — TELEPHONE ENCOUNTER
----- Message from HESHAM Naik CNP sent at 3/26/2025  8:07 AM EDT -----  Please notify patient of normal EKG results.  Ask her if she is feeling any better? Thanks Macy

## 2025-03-26 NOTE — TELEPHONE ENCOUNTER
Patient called office back and received results. Patient verbalized understanding. Patient feeling a little better and has been able to start eating little at a time.

## 2025-03-26 NOTE — TELEPHONE ENCOUNTER
----- Message from HESHAM Naik CNP sent at 3/26/2025  9:55 AM EDT -----  If she isn't able to eat a regular diet and feel better by next week have her make a follow up appointment.  ----- Message -----  From: Jaspal Krishnan CMA  Sent: 3/26/2025   9:52 AM EDT  To: HESHAM Mays CNP

## 2025-04-16 DIAGNOSIS — K21.9 GASTROESOPHAGEAL REFLUX DISEASE WITHOUT ESOPHAGITIS: ICD-10-CM

## 2025-04-16 RX ORDER — OMEPRAZOLE 20 MG/1
20 CAPSULE, DELAYED RELEASE ORAL
Qty: 90 CAPSULE | Refills: 1 | Status: SHIPPED | OUTPATIENT
Start: 2025-04-16

## 2025-07-29 DIAGNOSIS — F41.9 ANXIETY: ICD-10-CM

## 2025-07-29 RX ORDER — ALPRAZOLAM 0.25 MG
0.25 TABLET ORAL NIGHTLY PRN
Qty: 30 TABLET | Refills: 1 | Status: SHIPPED | OUTPATIENT
Start: 2025-07-29 | End: 2025-09-27

## 2025-07-29 NOTE — TELEPHONE ENCOUNTER
Health Maintenance   Topic Date Due    Diabetes screen  Never done    DEXA (modify frequency per FRAX score)  Never done    Breast cancer screen  03/25/2025    DTaP/Tdap/Td vaccine (1 - Tdap) 08/27/2025 (Originally 12/31/1975)    Pneumococcal 50+ years Vaccine (1 of 1 - PCV) 08/27/2025 (Originally 12/31/2006)    Hepatitis C screen  08/27/2025 (Originally 12/31/1974)    COVID-19 Vaccine (5 - 2024-25 season) 09/25/2025 (Originally 3/23/2025)    Flu vaccine (1) 08/01/2025    Annual Wellness Visit (Medicare)  09/26/2025    Depression Screen  01/16/2026    Lipids  09/30/2029    Respiratory Syncytial Virus (RSV) Pregnant or age 60 yrs+ (1 - 1-dose 75+ series) 12/31/2031    Colorectal Cancer Screen  11/15/2033    Shingles vaccine  Completed    Hepatitis A vaccine  Aged Out    Hepatitis B vaccine  Aged Out    Hib vaccine  Aged Out    Polio vaccine  Aged Out    Meningococcal (ACWY) vaccine  Aged Out    Meningococcal B vaccine  Aged Out             (applicable per patient's age: Cancer Screenings, Depression Screening, Fall Risk Screening, Immunizations)    AST (U/L)   Date Value   03/24/2025 21     ALT (U/L)   Date Value   03/24/2025 19     BUN (mg/dL)   Date Value   03/24/2025 11      (goal A1C is < 7)   (goal LDL is <100) need 30-50% reduction from baseline     BP Readings from Last 3 Encounters:   03/24/25 116/70   01/16/25 114/82   09/25/24 128/74    (goal /80)      All Future Testing planned in [facility]:  Lab Frequency Next Occurrence   DEXA BONE DENSITY 2 SITES Once 09/25/2024   Basic Metabolic Panel Once 09/25/2024   ALT Once 09/25/2024   AST Once 09/25/2024   Lipid Panel Once 09/25/2024       Next Visit Date:  Future Appointments   Date Time Provider Department Center   9/25/2025  8:20 AM Barry Nassar, APRN - CNP [facility]            Patient Active Problem List:     Bulging lumbar disc     Sciatica of left side     Gastroesophageal reflux disease without esophagitis     Nervousness